# Patient Record
Sex: FEMALE | Race: BLACK OR AFRICAN AMERICAN | NOT HISPANIC OR LATINO | Employment: FULL TIME | URBAN - METROPOLITAN AREA
[De-identification: names, ages, dates, MRNs, and addresses within clinical notes are randomized per-mention and may not be internally consistent; named-entity substitution may affect disease eponyms.]

---

## 2017-10-19 ENCOUNTER — OFFICE VISIT (OUTPATIENT)
Dept: URGENT CARE | Facility: CLINIC | Age: 47
End: 2017-10-19
Payer: MEDICAID

## 2017-10-19 VITALS
OXYGEN SATURATION: 100 % | WEIGHT: 154 LBS | HEIGHT: 66 IN | HEART RATE: 86 BPM | TEMPERATURE: 99 F | BODY MASS INDEX: 24.75 KG/M2 | DIASTOLIC BLOOD PRESSURE: 86 MMHG | RESPIRATION RATE: 18 BRPM | SYSTOLIC BLOOD PRESSURE: 122 MMHG

## 2017-10-19 DIAGNOSIS — J01.90 ACUTE SINUSITIS, RECURRENCE NOT SPECIFIED, UNSPECIFIED LOCATION: ICD-10-CM

## 2017-10-19 DIAGNOSIS — J02.9 ACUTE PHARYNGITIS, UNSPECIFIED ETIOLOGY: Primary | ICD-10-CM

## 2017-10-19 PROCEDURE — 99203 OFFICE O/P NEW LOW 30 MIN: CPT | Mod: 25,S$GLB,, | Performed by: FAMILY MEDICINE

## 2017-10-19 RX ORDER — DEXAMETHASONE SODIUM PHOSPHATE 100 MG/10ML
5 INJECTION INTRAMUSCULAR; INTRAVENOUS
Status: COMPLETED | OUTPATIENT
Start: 2017-10-19 | End: 2017-10-19

## 2017-10-19 RX ORDER — CEFDINIR 300 MG/1
300 CAPSULE ORAL 2 TIMES DAILY
Qty: 20 CAPSULE | Refills: 0 | Status: SHIPPED | OUTPATIENT
Start: 2017-10-19 | End: 2017-10-29

## 2017-10-19 RX ADMIN — DEXAMETHASONE SODIUM PHOSPHATE 5 MG: 100 INJECTION INTRAMUSCULAR; INTRAVENOUS at 11:10

## 2017-10-19 NOTE — PATIENT INSTRUCTIONS
Please drink plenty of fluids.  Please get plenty of rest.  Please return here or go to the Emergency Department for any concerns or worsening of condition.  If you were given wait & see antibiotics, please wait 3-5 days before taking them, and only take them if your symptoms have worsened or not improved.  If you do begin taking the antibiotics, please take them to completion.  If you were prescribed antibiotics, please take them to completion.    If you were prescribed a narcotic medication, do not drive or operate heavy equipment or machinery while taking these medications.    You were given a decongestant (RESCON or POLY VENT Dm).  If your insurance does not cover it or you cannot afford it, it is ok to use the over the counter products listed below.  If you do not have Hypertension or any history of palpitations, it is ok to take over the counter Sudafed or Mucinex D or Allegra-D or Claritin-D or Zyrtec-D.  If you do take one of the above, it is ok to combine that with plain over the counter Mucinex or Allegra or Claritin or Zyrtec.  If for example you are taking Zyrtec -D, you can combine that with Mucinex, but not Mucinex-D.  If you are taking Mucinex-D, you can combine that with plain Allegra or Claritin or Zyrtec.   If you do have Hypertension or palpitations, it is safe to take Coricidin HBP for relief of sinus symptoms.    We recommend you take over the counter Flonase (Fluticasone) or another nasally inhaled steroid unless you are already taking one.      Nasal irrigation with a saline spray or Netti Pot like device per their directions is also recommended.  If not allergic, please take over the counter Tylenol (Acetaminophen)  as directed for control of pain and/or fever.  You may take Sudafed every 6 hours as directed for congestion.    You were given an injection of steroid.  This will relieve swelling and inflammation and improve respiratory symptoms.  It can raise your blood sugar if you are  diabetic.    Please follow up with your primary care doctor or specialist as needed.    Aram Meneses MD  [phone]    If you  smoke, please stop smoking.         Pharyngitis: Strep (Presumed)    You have pharyngitis (sore throat). The cause is thought to be the streptococcus, or strep, bacterium. Strep throat infection can cause throat pain that is worse when swallowing, aching all over, headache, and fever. The infection may be spread by coughing, kissing, or touching others after touching your mouth or nose. Antibiotic medications are given to treat the infection.  Home care  · Rest at home. Drink plenty of fluids to avoid dehydration.  · No work or school for the first 2 days of taking the antibiotics. After this time, you will not be contagious. You can then return to work or school if you are feeling better.   · The antibiotic medication must be taken for the full 10 days, even if you feel better. This is very important to ensure the infection is treated. It is also important to prevent drug-resistant organisms from developing. If you were given an antibiotic shot, no more antibiotics are needed.  · You may use acetaminophen or ibuprofen to control pain or fever, unless another medicine was prescribed for this. If you have chronic liver or kidney disease or ever had a stomach ulcer or GI bleeding, talk with your doctor before using these medicines.  · Throat lozenges or a throat-numbing sprays can help reduce throat pain. Gargling with warm salt water can also help. Dissolve 1/2 teaspoon of salt in 1 8 ounce glass of warm water.   · Avoid salty or spicy foods, which can irritate the throat.  Follow-up care  Follow up with your healthcare provider or our staff if you are not improving over the next week.  When to seek medical advice  Call your healthcare provider right away if any of these occur:  · Fever as directed by your doctor.   · New or worsening ear pain, sinus pain, or headache  · Painful lumps  in the back of neck  · Stiff neck  · Lymph nodes are getting larger  · Inability to swallow liquids, excessive drooling, or inability to open mouth wide due to throat pain  · Signs of dehydration (very dark urine or no urine, sunken eyes, dizziness)  · Trouble breathing or noisy breathing  · Muffled voice  · New rash  Date Last Reviewed: 4/13/2015 © 2000-2016 [employer]. [address] [place] [address], [place] [address]. All rights reserved. This information is not intended as a substitute for professional medical care. Always follow your healthcare professional's instructions.      Acute Bacterial Rhinosinusitis (ABRS)    Acute bacterial rhinosinusitis (ABRS) is an infection of your nasal cavity and sinuses. Its caused by bacteria. Acute means that youve had symptoms for less than 12 weeks.  Understanding your sinuses  The nasal cavity is the large air-filled space behind your nose. The sinuses are a group of spaces formed by the bones of your face. They connect with your nasal cavity. ABRS causes the tissue lining these spaces to become inflamed. Mucus may not drain normally. This leads to facial pain and other symptoms.  What causes ABRS?  ABRS most often follows an upper respiratory infection caused by a virus. Bacteria then infect the lining of your nasal cavity and sinuses. But you can also get ABRS if you have:  · Nasal allergies  · Long-term nasal swelling and congestion not caused by allergies  · Blockage in the nose  Symptoms of ABRS  The symptoms of ABRS may be different for each person, and can include:  · Nasal congestion  · Runny nose  · Fluid draining from the nose down the throat (postnasal drip)  · Headache  · Cough  · Pain in the sinuses  · Thick, colored fluid from the nose (mucus)  · Fever  Diagnosing ABRS  ABRS may be diagnosed if youve had an upper respiratory infection like a cold and cough for longer than 10 to 14 days. Your health care provider will ask about your symptoms and  your medical history. The provider will check your vital signs, including your temperature. Youll have a physical exam. The health care provider will check your ears, nose, and throat. You likely wont need any tests. If ABRS comes back, you may have a culture or other tests.  Treatment for ABRS  Treatment may include:  · Antibiotic medicine. This is for symptoms that last for at least 10 to 14 days.  · Nasal corticosteroid medicine. Drops or spray used in the nose can lessen swelling and congestion.  · Over-the-counter pain medicine. This is to lessen sinus pain and pressure.  · Nasal decongestant medicine. Spray or drops may help to lessen congestion. Do not use them for more than a few days.  · Salt wash (saline irrigation). This can help to loosen mucus.  Possible complications of ABRS  ABRS may come back or become long-term (chronic).  In rare cases, ABRS may cause complications such as:   · Inflamed tissue around the brain and spinal cord (meningitis)  · Inflamed tissue around the eyes (orbital cellulitis)  · Inflamed bones around the sinuses (osteitis)  These problems may need to be treated in a hospital with intravenous (IV) antibiotic medicine or surgery.  When to call the health care provider  Call your health care provider if you have any of the following:  · Symptoms that dont get better, or get worse  · Symptoms that dont get better after 3 to 5 days on antibiotics  · Trouble seeing  · Swelling around your eyes  · Confusion or trouble staying awake   Date Last Reviewed: 3/3/2015  © 1112-4906 [employer]. [address] [place] [address], [place] [address]. All rights reserved. This information is not intended as a substitute for professional medical care. Always follow your healthcare professional's instructions.

## 2017-10-19 NOTE — LETTER
October 19, 2017  Dalia Valentine   [address]  [address] [facility] [address]                [facility]  [address], [address]  [facility] [address]  Phone: [phone]  Fax: [phone] Dalia Valentine was seen and treated in our Urgent Care department on 10/19/2017. She may return to work in 2 - 3 days.      If you have any questions or concerns, please don't hesitate to call.    Sincerely,        Eliu Kolb MD

## 2017-10-19 NOTE — PROGRESS NOTES
"Subjective:       Patient ID: Dalia Valentine is a 47 y.o. female.    Vitals:  height is 5' 6" (1.676 m) and weight is 69.9 kg (154 lb). Her temperature is 98.7 °F (37.1 °C). Her blood pressure is 122/86 and her pulse is 86. Her respiration is 18 and oxygen saturation is 100%.     Chief Complaint: Cough and Hoarse    Cough   This is a new problem. The current episode started 1 to 4 weeks ago. The problem has been gradually worsening. The problem occurs every few minutes. The cough is non-productive. Associated symptoms include chest pain, ear pain, heartburn, nasal congestion and postnasal drip. Pertinent negatives include no chills, eye redness, fever, headaches, myalgias, rash, sore throat, shortness of breath or wheezing. Nothing aggravates the symptoms. She has tried ipratropium inhaler, rest and steroid inhaler for the symptoms. The treatment provided mild relief. Her past medical history is significant for bronchitis and environmental allergies.     Review of Systems   Constitution: Positive for malaise/fatigue. Negative for chills and fever.   HENT: Positive for congestion, ear pain, hoarse voice and postnasal drip. Negative for sore throat.    Eyes: Negative for blurred vision, discharge and redness.   Cardiovascular: Positive for chest pain. Negative for dyspnea on exertion and leg swelling.        Substernal and right chest soreness   Respiratory: Positive for cough. Negative for shortness of breath, sputum production and wheezing.    Skin: Negative for rash.   Musculoskeletal: Positive for back pain. Negative for joint pain and myalgias.   Gastrointestinal: Positive for heartburn. Negative for abdominal pain, diarrhea, nausea and vomiting.   Neurological: Negative for headaches.   Psychiatric/Behavioral: The patient is not nervous/anxious.    Allergic/Immunologic: Positive for environmental allergies.       Objective:      Physical Exam   Constitutional: She is oriented to person, place, and " time. She appears well-developed and well-nourished. She is cooperative.  Non-toxic appearance. She does not appear ill. No distress.   HENT:   Head: Normocephalic and atraumatic.   Right Ear: Hearing, tympanic membrane, external ear and ear canal normal.   Left Ear: Hearing, tympanic membrane, external ear and ear canal normal.   Nose: No mucosal edema, rhinorrhea or nasal deformity. No epistaxis. Right sinus exhibits maxillary sinus tenderness and frontal sinus tenderness. Left sinus exhibits maxillary sinus tenderness and frontal sinus tenderness.   Mouth/Throat: Uvula is midline and mucous membranes are normal. No trismus in the jaw. Normal dentition. No uvula swelling. Posterior oropharyngeal edema and posterior oropharyngeal erythema present.   Eyes: Conjunctivae and lids are normal. No scleral icterus.   Sclera clear bilat   Neck: Trachea normal, full passive range of motion without pain and phonation normal. Neck supple.   Cardiovascular: Normal rate, regular rhythm, normal heart sounds, intact distal pulses and normal pulses.    Pulmonary/Chest: Effort normal and breath sounds normal. No respiratory distress.   Abdominal: Soft. Normal appearance and bowel sounds are normal. She exhibits no distension. There is no tenderness.   Musculoskeletal: Normal range of motion. She exhibits no edema or deformity.   Neurological: She is alert and oriented to person, place, and time. She exhibits normal muscle tone. Coordination normal.   Skin: Skin is warm, dry and intact. She is not diaphoretic. No pallor.   Psychiatric: She has a normal mood and affect. Her speech is normal and behavior is normal. Judgment and thought content normal. Cognition and memory are normal.   Nursing note and vitals reviewed.      Assessment:       1. Acute pharyngitis, unspecified etiology    2. Acute sinusitis, recurrence not specified, unspecified location        Plan:         Acute pharyngitis, unspecified etiology    Acute sinusitis,  recurrence not specified, unspecified location    Other orders  -     cefdinir (OMNICEF) 300 MG capsule; Take 1 capsule (300 mg total) by mouth 2 (two) times daily.  Dispense: 20 capsule; Refill: 0  -     pseudoephedrine-DM-guaifenesin (POLY-VENT DM) 60- mg Tab; Take 1 tablet by mouth every 6 (six) hours as needed.  Dispense: 20 tablet; Refill: 0  -     dexamethasone injection 5 mg; Inject 0.5 mLs (5 mg total) into the muscle one time.      Please drink plenty of fluids.  Please get plenty of rest.  Please return here or go to the Emergency Department for any concerns or worsening of condition.  If you were given wait & see antibiotics, please wait 3-5 days before taking them, and only take them if your symptoms have worsened or not improved.  If you do begin taking the antibiotics, please take them to completion.  If you were prescribed antibiotics, please take them to completion.  If you were prescribed a narcotic medication, do not drive or operate heavy equipment or machinery while taking these medications.    You were given a decongestant (RESCON or POLY VENT Dm).  If your insurance does not cover it or you cannot afford it, it is ok to use the over the counter products listed below.  If you do not have Hypertension or any history of palpitations, it is ok to take over the counter Sudafed or Mucinex D or Allegra-D or Claritin-D or Zyrtec-D.  If you do take one of the above, it is ok to combine that with plain over the counter Mucinex or Allegra or Claritin or Zyrtec.  If for example you are taking Zyrtec -D, you can combine that with Mucinex, but not Mucinex-D.  If you are taking Mucinex-D, you can combine that with plain Allegra or Claritin or Zyrtec.   If you do have Hypertension or palpitations, it is safe to take Coricidin HBP for relief of sinus symptoms.    We recommend you take over the counter Flonase (Fluticasone) or another nasally inhaled steroid unless you are already taking one.  Nasal  irrigation with a saline spray or Netti Pot like device per their directions is also recommended.  If not allergic, please take over the counter Tylenol (Acetaminophen) and/or Motrin (Ibuprofen) as directed for control of pain and/or fever.    Robitussin DM 2 teas every 4 hours as needed for cough.  If you  smoke, please stop smoking.    You were given an injection of steroid.  This will relieve swelling and inflammation and improve respiratory symptoms.  It can raise your blood sugar if you are diabetic.    Please follow up with your primary care doctor or specialist as needed.  Aram Meneses MD  [phone]

## 2018-04-02 ENCOUNTER — OFFICE VISIT (OUTPATIENT)
Dept: URGENT CARE | Facility: CLINIC | Age: 48
End: 2018-04-02
Payer: MEDICAID

## 2018-04-02 VITALS
BODY MASS INDEX: 25.66 KG/M2 | RESPIRATION RATE: 20 BRPM | OXYGEN SATURATION: 100 % | HEART RATE: 68 BPM | SYSTOLIC BLOOD PRESSURE: 143 MMHG | WEIGHT: 154 LBS | DIASTOLIC BLOOD PRESSURE: 91 MMHG | TEMPERATURE: 99 F | HEIGHT: 65 IN

## 2018-04-02 DIAGNOSIS — J01.40 ACUTE NON-RECURRENT PANSINUSITIS: ICD-10-CM

## 2018-04-02 DIAGNOSIS — J02.9 ACUTE PHARYNGITIS, UNSPECIFIED ETIOLOGY: Primary | ICD-10-CM

## 2018-04-02 PROCEDURE — 99214 OFFICE O/P EST MOD 30 MIN: CPT | Mod: S$GLB,,, | Performed by: FAMILY MEDICINE

## 2018-04-02 RX ORDER — CEFDINIR 300 MG/1
300 CAPSULE ORAL 2 TIMES DAILY
Qty: 20 CAPSULE | Refills: 0 | Status: SHIPPED | OUTPATIENT
Start: 2018-04-02 | End: 2018-04-12

## 2018-04-02 RX ORDER — DEXAMETHASONE SODIUM PHOSPHATE 100 MG/10ML
5 INJECTION INTRAMUSCULAR; INTRAVENOUS
Status: COMPLETED | OUTPATIENT
Start: 2018-04-02 | End: 2018-04-02

## 2018-04-02 RX ADMIN — DEXAMETHASONE SODIUM PHOSPHATE 5 MG: 100 INJECTION INTRAMUSCULAR; INTRAVENOUS at 05:04

## 2018-04-02 NOTE — PATIENT INSTRUCTIONS
Please drink plenty of fluids.  Please get plenty of rest.  Please return here or go to the Emergency Department for any concerns or worsening of condition.  If you were given wait & see antibiotics, please wait 3-5 days before taking them, and only take them if your symptoms have worsened or not improved.  If you do begin taking the antibiotics, please take them to completion.  If you were prescribed antibiotics, please take them to completion.  If you were prescribed a narcotic medication, do not drive or operate heavy equipment or machinery while taking these medications.    You were given a decongestant (RESCON or POLY VENT Dm).  If your insurance does not cover it or you cannot afford it, it is ok to use the over the counter products listed below.  If you do not have Hypertension or any history of palpitations, it is ok to take over the counter Sudafed or Mucinex D or Allegra-D or Claritin-D or Zyrtec-D.  If you do take one of the above, it is ok to combine that with plain over the counter Mucinex or Allegra or Claritin or Zyrtec.  If for example you are taking Zyrtec -D, you can combine that with Mucinex, but not Mucinex-D.  If you are taking Mucinex-D, you can combine that with plain Allegra or Claritin or Zyrtec.   If you do have Hypertension or palpitations, it is safe to take Coricidin HBP for relief of sinus symptoms.    We recommend you take over the counter Flonase (Fluticasone) or another nasally inhaled steroid unless you are already taking one.  Nasal irrigation with a saline spray or Netti Pot like device per their directions is also recommended.  If not allergic, please take over the counter Tylenol (Acetaminophen) and/or Motrin (Ibuprofen) as directed for control of pain and/or fever.    Robitussin DM 2 teas every 4 hours as needed for cough.  If you  smoke, please stop smoking.    You were given an injection of steroid.  This will relieve swelling and inflammation and improve respiratory  symptoms.  It can raise your blood sugar if you are diabetic.    Please follow up with your primary care doctor or specialist as needed.  Aarm Meneses MD  [phone]        Acute Bacterial Rhinosinusitis (ABRS)  Acute bacterial rhinosinusitis (ABRS) is an infection of your nasal cavity and sinuses. Its caused by bacteria. Acute means that youve had symptoms for less than 12 weeks.  Understanding your sinuses  The nasal cavity is the large air-filled space behind your nose. The sinuses are a group of spaces formed by the bones of your face. They connect with your nasal cavity. ABRS causes the tissue lining these spaces to become inflamed. Mucus may not drain normally. This leads to facial pain and other symptoms.  What causes ABRS?  ABRS most often follows an upper respiratory infection caused by a virus. Bacteria then infect the lining of your nasal cavity and sinuses. But you can also get ABRS if you have:  · Nasal allergies  · Long-term nasal swelling and congestion not caused by allergies  · Blockage in the nose  Symptoms of ABRS  The symptoms of ABRS may be different for each person, and can include:  · Nasal congestion  · Runny nose  · Fluid draining from the nose down the throat (postnasal drip)  · Headache  · Cough  · Pain in the sinuses  · Thick, colored fluid from the nose (mucus)  · Fever  Diagnosing ABRS  ABRS may be diagnosed if youve had an upper respiratory infection like a cold and cough for longer than 10 to 14 days. Your health care provider will ask about your symptoms and your medical history. The provider will check your vital signs, including your temperature. Youll have a physical exam. The health care provider will check your ears, nose, and throat. You likely wont need any tests. If ABRS comes back, you may have a culture or other tests.  Treatment for ABRS  Treatment may include:  · Antibiotic medicine. This is for symptoms that last for at least 10 to 14 days.  · Nasal  corticosteroid medicine. Drops or spray used in the nose can lessen swelling and congestion.  · Over-the-counter pain medicine. This is to lessen sinus pain and pressure.  · Nasal decongestant medicine. Spray or drops may help to lessen congestion. Do not use them for more than a few days.  · Salt wash (saline irrigation). This can help to loosen mucus.  Possible complications of ABRS  ABRS may come back or become long-term (chronic).  In rare cases, ABRS may cause complications such as:   · Inflamed tissue around the brain and spinal cord (meningitis)  · Inflamed tissue around the eyes (orbital cellulitis)  · Inflamed bones around the sinuses (osteitis)  These problems may need to be treated in a hospital with intravenous (IV) antibiotic medicine or surgery.  When to call the health care provider  Call your health care provider if you have any of the following:  · Symptoms that dont get better, or get worse  · Symptoms that dont get better after 3 to 5 days on antibiotics  · Trouble seeing  · Swelling around your eyes  · Confusion or trouble staying awake   Date Last Reviewed: 3/3/2015  © 5854-9719 [employer]. [address] [place] [address]. All rights reserved. This information is not intended as a substitute for professional medical care. Always follow your healthcare professional's instructions.      Pharyngitis: Strep (Presumed)    You have pharyngitis (sore throat). The cause is thought to be the streptococcus, or strep, bacterium. Strep throat infection can cause throat pain that is worse when swallowing, aching all over, headache, and fever. The infection may be spread by coughing, kissing, or touching others after touching your mouth or nose. Antibiotic medications are given to treat the infection.  Home care  · Rest at home. Drink plenty of fluids to avoid dehydration.  · No work or school for the first 2 days of taking the antibiotics. After this time, you will not be  contagious. You can then return to work or school if you are feeling better.   · The antibiotic medication must be taken for the full 10 days, even if you feel better. This is very important to ensure the infection is treated. It is also important to prevent drug-resistant organisms from developing. If you were given an antibiotic shot, no more antibiotics are needed.  · You may use acetaminophen or ibuprofen to control pain or fever, unless another medicine was prescribed for this. If you have chronic liver or kidney disease or ever had a stomach ulcer or GI bleeding, talk with your doctor before using these medicines.  · Throat lozenges or a throat-numbing sprays can help reduce throat pain. Gargling with warm salt water can also help. Dissolve 1/2 teaspoon of salt in 1 8 ounce glass of warm water.   · Avoid salty or spicy foods, which can irritate the throat.  Follow-up care  Follow up with your healthcare provider or our staff if you are not improving over the next week.  When to seek medical advice  Call your healthcare provider right away if any of these occur:  · Fever as directed by your doctor.   · New or worsening ear pain, sinus pain, or headache  · Painful lumps in the back of neck  · Stiff neck  · Lymph nodes are getting larger  · Inability to swallow liquids, excessive drooling, or inability to open mouth wide due to throat pain  · Signs of dehydration (very dark urine or no urine, sunken eyes, dizziness)  · Trouble breathing or noisy breathing  · Muffled voice  · New rash  Date Last Reviewed: 4/13/2015  © 8703-0569 [email or web address]. [address] [place] [address], [place] [address]. All rights reserved. This information is not intended as a substitute for professional medical care. Always follow your healthcare professional's instructions.

## 2018-04-02 NOTE — PROGRESS NOTES
"Subjective:       Patient ID: Dalia Valentine is a 47 y.o. female.    Vitals:  height is 5' 5" (1.651 m) and weight is 69.9 kg (154 lb). Her oral temperature is 98.9 °F (37.2 °C). Her blood pressure is 143/91 (abnormal) and her pulse is 68. Her respiration is 20 and oxygen saturation is 100%.     Chief Complaint: Sinus Problem    Sinus Problem   This is a new problem. The current episode started more than 1 month ago (1 month). The problem has been gradually worsening since onset. There has been no fever. Associated symptoms include congestion and coughing. Pertinent negatives include no chills, ear pain, headaches, hoarse voice, shortness of breath or sore throat. Past treatments include oral decongestants. The treatment provided mild relief.     Review of Systems   Constitution: Negative for chills, fever and malaise/fatigue.   HENT: Positive for congestion and nosebleeds. Negative for ear pain, hoarse voice and sore throat.    Eyes: Negative for discharge and redness.   Cardiovascular: Negative for chest pain, dyspnea on exertion and leg swelling.   Respiratory: Positive for cough. Negative for shortness of breath, sputum production and wheezing.    Musculoskeletal: Negative for myalgias.   Gastrointestinal: Negative for abdominal pain and nausea.   Neurological: Negative for headaches.       Objective:      Physical Exam   Constitutional: She is oriented to person, place, and time. She appears well-developed and well-nourished. She is cooperative.  Non-toxic appearance. She does not appear ill. No distress.   HENT:   Head: Normocephalic and atraumatic.   Right Ear: Hearing, tympanic membrane, external ear and ear canal normal.   Left Ear: Hearing, tympanic membrane, external ear and ear canal normal.   Nose: No mucosal edema, rhinorrhea or nasal deformity. No epistaxis. Right sinus exhibits maxillary sinus tenderness and frontal sinus tenderness. Left sinus exhibits maxillary sinus tenderness and " frontal sinus tenderness.   Mouth/Throat: Uvula is midline and mucous membranes are normal. No trismus in the jaw. Normal dentition. No uvula swelling. Posterior oropharyngeal edema and posterior oropharyngeal erythema present.   Eyes: Conjunctivae and lids are normal. No scleral icterus.   Sclera clear bilat   Neck: Trachea normal, full passive range of motion without pain and phonation normal. Neck supple.   Cardiovascular: Normal rate, regular rhythm, normal heart sounds, intact distal pulses and normal pulses.    Pulmonary/Chest: Effort normal and breath sounds normal. No respiratory distress.   Abdominal: Soft. Normal appearance and bowel sounds are normal. She exhibits no distension. There is no tenderness.   Musculoskeletal: Normal range of motion. She exhibits no edema or deformity.   Neurological: She is alert and oriented to person, place, and time. She exhibits normal muscle tone. Coordination normal.   Skin: Skin is warm, dry and intact. She is not diaphoretic. No pallor.   Psychiatric: She has a normal mood and affect. Her speech is normal and behavior is normal. Judgment and thought content normal. Cognition and memory are normal.   Nursing note and vitals reviewed.      Assessment:       1. Acute pharyngitis, unspecified etiology    2. Acute non-recurrent pansinusitis        Plan:         Acute pharyngitis, unspecified etiology    Acute non-recurrent pansinusitis    Other orders  -     cefdinir (OMNICEF) 300 MG capsule; Take 1 capsule (300 mg total) by mouth 2 (two) times daily.  Dispense: 20 capsule; Refill: 0  -     pseudoephedrine-DM-guaifenesin (POLY-VENT DM) 60- mg Tab; Take 1 tablet by mouth every 6 (six) hours as needed.  Dispense: 20 tablet; Refill: 0  -     dexamethasone injection 5 mg; Inject 0.5 mLs (5 mg total) into the muscle one time.      Please drink plenty of fluids.  Please get plenty of rest.  Please return here or go to the Emergency Department for any concerns or worsening  of condition.  If you were given wait & see antibiotics, please wait 3-5 days before taking them, and only take them if your symptoms have worsened or not improved.  If you do begin taking the antibiotics, please take them to completion.  If you were prescribed antibiotics, please take them to completion.  If you were prescribed a narcotic medication, do not drive or operate heavy equipment or machinery while taking these medications.    You were given a decongestant (RESCON or POLY VENT Dm).  If your insurance does not cover it or you cannot afford it, it is ok to use the over the counter products listed below.  If you do not have Hypertension or any history of palpitations, it is ok to take over the counter Sudafed or Mucinex D or Allegra-D or Claritin-D or Zyrtec-D.  If you do take one of the above, it is ok to combine that with plain over the counter Mucinex or Allegra or Claritin or Zyrtec.  If for example you are taking Zyrtec -D, you can combine that with Mucinex, but not Mucinex-D.  If you are taking Mucinex-D, you can combine that with plain Allegra or Claritin or Zyrtec.   If you do have Hypertension or palpitations, it is safe to take Coricidin HBP for relief of sinus symptoms.    We recommend you take over the counter Flonase (Fluticasone) or another nasally inhaled steroid unless you are already taking one.  Nasal irrigation with a saline spray or Netti Pot like device per their directions is also recommended.  If not allergic, please take over the counter Tylenol (Acetaminophen) and/or Motrin (Ibuprofen) as directed for control of pain and/or fever.    Robitussin DM 2 teas every 4 hours as needed for cough.  If you  smoke, please stop smoking.    You were given an injection of steroid.  This will relieve swelling and inflammation and improve respiratory symptoms.  It can raise your blood sugar if you are diabetic.    Please follow up with your primary care doctor or specialist as needed.  Aram DIGGS  MD [facility]  [phone]

## 2018-04-02 NOTE — LETTER
April 2, 2018  Dalia Valentine   [address]  [address] [address]                [facility]  [address], [address]  [place] [address]  Phone: [phone]  Fax: [phone] Dalia Valentine was seen and treated in our Urgent Care department   on 4/2/2018. She may return to work in 2 - 3 days.      If you have any questions or concerns, please don't hesitate to call.    Sincerely,        Eliu Kolb MD

## 2018-05-15 PROBLEM — Z86.2 HISTORY OF IRON DEFICIENCY ANEMIA: Status: ACTIVE | Noted: 2018-05-15

## 2018-08-12 ENCOUNTER — OFFICE VISIT (OUTPATIENT)
Dept: URGENT CARE | Facility: CLINIC | Age: 48
End: 2018-08-12
Payer: MEDICAID

## 2018-08-12 VITALS
TEMPERATURE: 99 F | WEIGHT: 163 LBS | HEART RATE: 92 BPM | OXYGEN SATURATION: 98 % | HEIGHT: 66 IN | DIASTOLIC BLOOD PRESSURE: 95 MMHG | SYSTOLIC BLOOD PRESSURE: 138 MMHG | BODY MASS INDEX: 26.2 KG/M2

## 2018-08-12 DIAGNOSIS — J02.9 ACUTE PHARYNGITIS, UNSPECIFIED ETIOLOGY: Primary | ICD-10-CM

## 2018-08-12 DIAGNOSIS — J01.90 ACUTE SINUSITIS, RECURRENCE NOT SPECIFIED, UNSPECIFIED LOCATION: ICD-10-CM

## 2018-08-12 PROCEDURE — 99214 OFFICE O/P EST MOD 30 MIN: CPT | Mod: S$GLB,,, | Performed by: FAMILY MEDICINE

## 2018-08-12 RX ORDER — DEXAMETHASONE SODIUM PHOSPHATE 100 MG/10ML
5 INJECTION INTRAMUSCULAR; INTRAVENOUS
Status: COMPLETED | OUTPATIENT
Start: 2018-08-12 | End: 2018-08-12

## 2018-08-12 RX ORDER — AMITRIPTYLINE HYDROCHLORIDE 10 MG/1
10 TABLET, FILM COATED ORAL NIGHTLY PRN
COMMUNITY
End: 2021-04-01 | Stop reason: SDUPTHER

## 2018-08-12 RX ORDER — CEFDINIR 300 MG/1
300 CAPSULE ORAL 2 TIMES DAILY
Qty: 20 CAPSULE | Refills: 0 | Status: SHIPPED | OUTPATIENT
Start: 2018-08-12 | End: 2018-08-22

## 2018-08-12 RX ORDER — PREDNISONE 20 MG/1
40 TABLET ORAL DAILY
Qty: 10 TABLET | Refills: 0 | Status: SHIPPED | OUTPATIENT
Start: 2018-08-12 | End: 2018-08-17

## 2018-08-12 RX ADMIN — DEXAMETHASONE SODIUM PHOSPHATE 5 MG: 100 INJECTION INTRAMUSCULAR; INTRAVENOUS at 05:08

## 2018-08-12 NOTE — PROGRESS NOTES
"Subjective:       Patient ID: Dalia Valentine is a 47 y.o. female.    Vitals:  height is 5' 6" (1.676 m) and weight is 73.9 kg (163 lb). Her oral temperature is 98.9 °F (37.2 °C). Her blood pressure is 138/95 (abnormal) and her pulse is 92. Her oxygen saturation is 98%.     Chief Complaint: Sinus Problem    Sinus Problem   This is a new problem. Episode onset: 3 days ago. The problem has been gradually worsening since onset. There has been no fever. Associated symptoms include chills, congestion, coughing, ear pain, headaches, neck pain, sinus pressure, sneezing and a sore throat (right side of throat hurts). Pertinent negatives include no hoarse voice or shortness of breath. Past treatments include acetaminophen. The treatment provided no relief.     Review of Systems   Constitution: Positive for chills. Negative for fever and malaise/fatigue.   HENT: Positive for congestion, ear pain, sinus pressure, sneezing and sore throat (right side of throat hurts). Negative for hoarse voice.    Eyes: Negative for discharge and redness.   Cardiovascular: Negative for chest pain, dyspnea on exertion and leg swelling.   Respiratory: Positive for cough. Negative for shortness of breath, sputum production and wheezing.    Musculoskeletal: Positive for neck pain. Negative for myalgias.   Gastrointestinal: Positive for flatus. Negative for abdominal pain and nausea.   Neurological: Positive for headaches.       Objective:      Physical Exam   Constitutional: She is oriented to person, place, and time. She appears well-developed and well-nourished. She is cooperative.  Non-toxic appearance. She does not appear ill. No distress.   HENT:   Head: Normocephalic and atraumatic.   Right Ear: Hearing, tympanic membrane, external ear and ear canal normal.   Left Ear: Hearing, tympanic membrane, external ear and ear canal normal.   Nose: No mucosal edema, rhinorrhea or nasal deformity. No epistaxis. Right sinus exhibits maxillary " sinus tenderness and frontal sinus tenderness. Left sinus exhibits maxillary sinus tenderness and frontal sinus tenderness.   Mouth/Throat: Uvula is midline and mucous membranes are normal. No trismus in the jaw. Normal dentition. No uvula swelling. Posterior oropharyngeal edema and posterior oropharyngeal erythema present.   Eyes: Conjunctivae and lids are normal. No scleral icterus.   Neck: Trachea normal, full passive range of motion without pain and phonation normal. Neck supple.   Cardiovascular: Normal rate, regular rhythm, normal heart sounds, intact distal pulses and normal pulses.   Pulmonary/Chest: Effort normal and breath sounds normal. No respiratory distress.   Abdominal: Soft. Normal appearance and bowel sounds are normal. She exhibits no distension. There is no tenderness.   Musculoskeletal: Normal range of motion. She exhibits no edema or deformity.   Neurological: She is alert and oriented to person, place, and time. She exhibits normal muscle tone. Coordination normal.   Skin: Skin is warm, dry and intact. She is not diaphoretic. No pallor.   Psychiatric: She has a normal mood and affect. Her speech is normal and behavior is normal. Judgment and thought content normal. Cognition and memory are normal.   Nursing note and vitals reviewed.      Assessment:       1. Acute pharyngitis, unspecified etiology    2. Acute sinusitis, recurrence not specified, unspecified location        Plan:         Acute pharyngitis, unspecified etiology    Acute sinusitis, recurrence not specified, unspecified location  -     predniSONE (DELTASONE) 20 MG tablet; Take 2 tablets (40 mg total) by mouth once daily. for 5 days  Dispense: 10 tablet; Refill: 0  -     cefdinir (OMNICEF) 300 MG capsule; Take 1 capsule (300 mg total) by mouth 2 (two) times daily. for 10 days  Dispense: 20 capsule; Refill: 0  -     pseudoephedrine-DM-guaifenesin (POLY-VENT DM) 60- mg Tab; Take 1 tablet by mouth every 6 (six) hours as needed.   Dispense: 20 tablet; Refill: 0  -     dexamethasone injection 5 mg; Inject 0.5 mLs (5 mg total) into the muscle one time.    Please drink plenty of fluids.  Please get plenty of rest.  Please return here or go to the Emergency Department for any concerns or worsening of condition.  If you were given wait & see antibiotics, please wait 3-5 days before taking them, and only take them if your symptoms have worsened or not improved.  If you do begin taking the antibiotics, please take them to completion.  If you were prescribed antibiotics, please take them to completion.  If you were prescribed a narcotic medication, do not drive or operate heavy equipment or machinery while taking these medications.    You were given a decongestant (RESCON or POLY VENT Dm).  If your insurance does not cover it or you cannot afford it, it is ok to use the over the counter products listed below.  If you do not have Hypertension or any history of palpitations, it is ok to take over the counter Sudafed or Mucinex D or Allegra-D or Claritin-D or Zyrtec-D.  If you do take one of the above, it is ok to combine that with plain over the counter Mucinex or Allegra or Claritin or Zyrtec.  If for example you are taking Zyrtec -D, you can combine that with Mucinex, but not Mucinex-D.  If you are taking Mucinex-D, you can combine that with plain Allegra or Claritin or Zyrtec.   If you do have Hypertension or palpitations, it is safe to take Coricidin HBP for relief of sinus symptoms.    We recommend you take over the counter Flonase (Fluticasone) or another nasally inhaled steroid unless you are already taking one.  Nasal irrigation with a saline spray or Netti Pot like device per their directions is also recommended.  If not allergic, please take over the counter Tylenol (Acetaminophen) and/or Motrin (Ibuprofen) as directed for control of pain and/or fever.    Robitussin DM 2 teas every 4 hours as needed for cough.  If you  smoke, please stop  smoking.    You were given an injection of steroid.  This will relieve swelling and inflammation and improve respiratory symptoms.  It can raise your blood sugar if you are diabetic.    Please follow up with your primary care doctor or specialist as needed.  Aram Meneses MD  [phone]

## 2018-08-12 NOTE — PATIENT INSTRUCTIONS
Please drink plenty of fluids.  Please get plenty of rest.  Please return here or go to the Emergency Department for any concerns or worsening of condition.  If you were given wait & see antibiotics, please wait 3-5 days before taking them, and only take them if your symptoms have worsened or not improved.  If you do begin taking the antibiotics, please take them to completion.  If you were prescribed antibiotics, please take them to completion.  If you were prescribed a narcotic medication, do not drive or operate heavy equipment or machinery while taking these medications.    You were given a decongestant (RESCON or POLY VENT Dm).  If your insurance does not cover it or you cannot afford it, it is ok to use the over the counter products listed below.  If you do not have Hypertension or any history of palpitations, it is ok to take over the counter Sudafed or Mucinex D or Allegra-D or Claritin-D or Zyrtec-D.  If you do take one of the above, it is ok to combine that with plain over the counter Mucinex or Allegra or Claritin or Zyrtec.  If for example you are taking Zyrtec -D, you can combine that with Mucinex, but not Mucinex-D.  If you are taking Mucinex-D, you can combine that with plain Allegra or Claritin or Zyrtec.   If you do have Hypertension or palpitations, it is safe to take Coricidin HBP for relief of sinus symptoms.    We recommend you take over the counter Flonase (Fluticasone) or another nasally inhaled steroid unless you are already taking one.  Nasal irrigation with a saline spray or Netti Pot like device per their directions is also recommended.  If not allergic, please take over the counter Tylenol (Acetaminophen) and/or Motrin (Ibuprofen) as directed for control of pain and/or fever.    Robitussin DM 2 teas every 4 hours as needed for cough.  If you  smoke, please stop smoking.    You were given an injection of steroid.  This will relieve swelling and inflammation and improve respiratory  symptoms.  It can raise your blood sugar if you are diabetic.    Please follow up with your primary care doctor or specialist as needed.  Aram Meneses MD  [phone]        Acute Bacterial Rhinosinusitis (ABRS)  Acute bacterial rhinosinusitis (ABRS) is an infection of your nasal cavity and sinuses. Its caused by bacteria. Acute means that youve had symptoms for less than 12 weeks.  Understanding your sinuses  The nasal cavity is the large air-filled space behind your nose. The sinuses are a group of spaces formed by the bones of your face. They connect with your nasal cavity. ABRS causes the tissue lining these spaces to become inflamed. Mucus may not drain normally. This leads to facial pain and other symptoms.  What causes ABRS?  ABRS most often follows an upper respiratory infection caused by a virus. Bacteria then infect the lining of your nasal cavity and sinuses. But you can also get ABRS if you have:  · Nasal allergies  · Long-term nasal swelling and congestion not caused by allergies  · Blockage in the nose  Symptoms of ABRS  The symptoms of ABRS may be different for each person, and can include:  · Nasal congestion  · Runny nose  · Fluid draining from the nose down the throat (postnasal drip)  · Headache  · Cough  · Pain in the sinuses  · Thick, colored fluid from the nose (mucus)  · Fever  Diagnosing ABRS  ABRS may be diagnosed if youve had an upper respiratory infection like a cold and cough for longer than 10 to 14 days. Your health care provider will ask about your symptoms and your medical history. The provider will check your vital signs, including your temperature. Youll have a physical exam. The health care provider will check your ears, nose, and throat. You likely wont need any tests. If ABRS comes back, you may have a culture or other tests.  Treatment for ABRS  Treatment may include:  · Antibiotic medicine. This is for symptoms that last for at least 10 to 14 days.  · Nasal  corticosteroid medicine. Drops or spray used in the nose can lessen swelling and congestion.  · Over-the-counter pain medicine. This is to lessen sinus pain and pressure.  · Nasal decongestant medicine. Spray or drops may help to lessen congestion. Do not use them for more than a few days.  · Salt wash (saline irrigation). This can help to loosen mucus.  Possible complications of ABRS  ABRS may come back or become long-term (chronic).  In rare cases, ABRS may cause complications such as:   · Inflamed tissue around the brain and spinal cord (meningitis)  · Inflamed tissue around the eyes (orbital cellulitis)  · Inflamed bones around the sinuses (osteitis)  These problems may need to be treated in a hospital with intravenous (IV) antibiotic medicine or surgery.  When to call the health care provider  Call your health care provider if you have any of the following:  · Symptoms that dont get better, or get worse  · Symptoms that dont get better after 3 to 5 days on antibiotics  · Trouble seeing  · Swelling around your eyes  · Confusion or trouble staying awake   Date Last Reviewed: 3/3/2015  © 4096-9078 [employer]. [address] [place] [address]. All rights reserved. This information is not intended as a substitute for professional medical care. Always follow your healthcare professional's instructions.      Pharyngitis: Strep (Presumed)    You have pharyngitis (sore throat). The cause is thought to be the streptococcus, or strep, bacterium. Strep throat infection can cause throat pain that is worse when swallowing, aching all over, headache, and fever. The infection may be spread by coughing, kissing, or touching others after touching your mouth or nose. Antibiotic medications are given to treat the infection.  Home care  · Rest at home. Drink plenty of fluids to avoid dehydration.  · No work or school for the first 2 days of taking the antibiotics. After this time, you will not be  contagious. You can then return to work or school if you are feeling better.   · The antibiotic medication must be taken for the full 10 days, even if you feel better. This is very important to ensure the infection is treated. It is also important to prevent drug-resistant organisms from developing. If you were given an antibiotic shot, no more antibiotics are needed.  · You may use acetaminophen or ibuprofen to control pain or fever, unless another medicine was prescribed for this. If you have chronic liver or kidney disease or ever had a stomach ulcer or GI bleeding, talk with your doctor before using these medicines.  · Throat lozenges or a throat-numbing sprays can help reduce throat pain. Gargling with warm salt water can also help. Dissolve 1/2 teaspoon of salt in 1 8 ounce glass of warm water.   · Avoid salty or spicy foods, which can irritate the throat.  Follow-up care  Follow up with your healthcare provider or our staff if you are not improving over the next week.  When to seek medical advice  Call your healthcare provider right away if any of these occur:  · Fever as directed by your doctor.   · New or worsening ear pain, sinus pain, or headache  · Painful lumps in the back of neck  · Stiff neck  · Lymph nodes are getting larger  · Inability to swallow liquids, excessive drooling, or inability to open mouth wide due to throat pain  · Signs of dehydration (very dark urine or no urine, sunken eyes, dizziness)  · Trouble breathing or noisy breathing  · Muffled voice  · New rash  Date Last Reviewed: 4/13/2015  © 3148-4911 [employer] [place]. [address] [place] [address], [place] [address]. All rights reserved. This information is not intended as a substitute for professional medical care. Always follow your healthcare professional's instructions.

## 2019-01-14 ENCOUNTER — OFFICE VISIT (OUTPATIENT)
Dept: URGENT CARE | Facility: CLINIC | Age: 49
End: 2019-01-14
Payer: MEDICAID

## 2019-01-14 VITALS
SYSTOLIC BLOOD PRESSURE: 140 MMHG | HEART RATE: 94 BPM | OXYGEN SATURATION: 100 % | DIASTOLIC BLOOD PRESSURE: 86 MMHG | HEIGHT: 66 IN | WEIGHT: 174 LBS | BODY MASS INDEX: 27.97 KG/M2 | TEMPERATURE: 99 F | RESPIRATION RATE: 20 BRPM

## 2019-01-14 DIAGNOSIS — H66.91 RIGHT OTITIS MEDIA, UNSPECIFIED OTITIS MEDIA TYPE: Primary | ICD-10-CM

## 2019-01-14 PROCEDURE — 99214 OFFICE O/P EST MOD 30 MIN: CPT | Mod: 25,S$GLB,, | Performed by: PHYSICIAN ASSISTANT

## 2019-01-14 PROCEDURE — 99214 PR OFFICE/OUTPT VISIT, EST, LEVL IV, 30-39 MIN: ICD-10-PCS | Mod: 25,S$GLB,, | Performed by: PHYSICIAN ASSISTANT

## 2019-01-14 RX ORDER — DEXAMETHASONE SODIUM PHOSPHATE 100 MG/10ML
5 INJECTION INTRAMUSCULAR; INTRAVENOUS
Status: COMPLETED | OUTPATIENT
Start: 2019-01-14 | End: 2019-01-14

## 2019-01-14 RX ORDER — CEFDINIR 300 MG/1
300 CAPSULE ORAL 2 TIMES DAILY
Qty: 20 CAPSULE | Refills: 0 | Status: SHIPPED | OUTPATIENT
Start: 2019-01-14 | End: 2019-01-24

## 2019-01-14 RX ORDER — CETIRIZINE HYDROCHLORIDE, PSEUDOEPHEDRINE HYDROCHLORIDE 5; 120 MG/1; MG/1
1 TABLET, FILM COATED, EXTENDED RELEASE ORAL DAILY
Qty: 14 TABLET | Refills: 0 | Status: SHIPPED | OUTPATIENT
Start: 2019-01-14 | End: 2019-01-28

## 2019-01-14 RX ADMIN — DEXAMETHASONE SODIUM PHOSPHATE 5 MG: 100 INJECTION INTRAMUSCULAR; INTRAVENOUS at 06:01

## 2019-01-14 NOTE — LETTER
January 14, 2019      [facility]  [address]  [facility] [address]  Phone: [phone]  Fax: [phone]       Patient: Dalia Valentine   YOB: 1970  Date of Visit: 01/14/2019    To Whom It May Concern:    Jacinto Valentine  was at [facility] on 01/14/2019. She may return to work/school on 1/16/2019 with no restrictions. If you have any questions or concerns, or if I can be of further assistance, please do not hesitate to contact me.    Sincerely,    Himanshu Garcia PA-C

## 2019-01-15 NOTE — PROGRESS NOTES
"Subjective:       Patient ID: Dalia Valentine is a 48 y.o. female.    Vitals:  height is 5' 6" (1.676 m) and weight is 78.9 kg (174 lb). Her oral temperature is 98.7 °F (37.1 °C). Her blood pressure is 140/86 (abnormal) and her pulse is 94. Her respiration is 20 and oxygen saturation is 100%.     Chief Complaint: Sore Throat    Sore Throat    The problem has been gradually worsening. The pain is at a severity of 8/10. The pain is moderate. Associated symptoms include congestion, ear pain (right), a hoarse voice and trouble swallowing. Pertinent negatives include no coughing, shortness of breath, stridor or vomiting. She has had no exposure to strep or mono. Treatments tried: decongestion. The treatment provided mild relief.       Constitution: Negative for chills, sweating, fatigue and fever.   HENT: Positive for ear pain (right), congestion, sore throat and trouble swallowing. Negative for sinus pain, sinus pressure and voice change.    Neck: Negative for painful lymph nodes.   Eyes: Negative for eye redness.   Respiratory: Negative for chest tightness, cough, sputum production, bloody sputum, COPD, shortness of breath, stridor, wheezing and asthma.    Gastrointestinal: Negative for nausea and vomiting.   Musculoskeletal: Negative for muscle ache.   Skin: Negative for rash.   Allergic/Immunologic: Negative for seasonal allergies and asthma.   Hematologic/Lymphatic: Negative for swollen lymph nodes.       Objective:      Physical Exam   Constitutional: She is oriented to person, place, and time. She appears well-developed and well-nourished. She is cooperative.  Non-toxic appearance. She does not have a sickly appearance. She does not appear ill. No distress.   HENT:   Head: Normocephalic and atraumatic.   Right Ear: Hearing, external ear and ear canal normal. Tympanic membrane is erythematous.   Left Ear: Hearing, tympanic membrane, external ear and ear canal normal.   Nose: Mucosal edema present. No " rhinorrhea or nasal deformity. No epistaxis. Right sinus exhibits no maxillary sinus tenderness and no frontal sinus tenderness. Left sinus exhibits no maxillary sinus tenderness and no frontal sinus tenderness.   Mouth/Throat: Uvula is midline and mucous membranes are normal. No trismus in the jaw. Normal dentition. No uvula swelling. Posterior oropharyngeal erythema (mild with pnd) present. No oropharyngeal exudate or posterior oropharyngeal edema.   Eyes: Conjunctivae, EOM and lids are normal. Pupils are equal, round, and reactive to light. No scleral icterus.   Sclera clear bilat   Neck: Trachea normal, full passive range of motion without pain and phonation normal. Neck supple. No neck rigidity. No edema and no erythema present.   Cardiovascular: Normal rate, regular rhythm, normal heart sounds, intact distal pulses and normal pulses.   Pulmonary/Chest: Effort normal and breath sounds normal. No respiratory distress. She has no decreased breath sounds. She has no wheezes. She has no rhonchi. She has no rales.   Abdominal: Soft. Normal appearance and bowel sounds are normal. She exhibits no distension. There is no tenderness.   Musculoskeletal: Normal range of motion. She exhibits no edema or deformity.   Lymphadenopathy:     She has no cervical adenopathy.   Neurological: She is alert and oriented to person, place, and time. She exhibits normal muscle tone. Coordination normal.   Skin: Skin is warm, dry and intact. She is not diaphoretic. No pallor.   Psychiatric: She has a normal mood and affect. Her speech is normal and behavior is normal. Judgment and thought content normal. Cognition and memory are normal.   Nursing note and vitals reviewed.      Assessment:       1. Right otitis media, unspecified otitis media type        Plan:         Right otitis media, unspecified otitis media type  -     cefdinir (OMNICEF) 300 MG capsule; Take 1 capsule (300 mg total) by mouth 2 (two) times daily. for 10 days   Dispense: 20 capsule; Refill: 0  -     dexamethasone injection 5 mg  -     cetirizine-pseudoephedrine 5-120 mg Tb12; Take 1 tablet by mouth once daily. for 14 days  Dispense: 14 tablet; Refill: 0      Patient Instructions   · Follow up with your primary care in 2-5 days if symptoms have not improved, or you may return here.  · If you were referred to a specialist, please follow up with that specialty.  · If you were prescribed antibiotics, please take them to completion.  · If you were prescribed a narcotic or any medication with sedative effects, do not drive or operate heavy equipment or machinery while taking these medications.  · You must understand that you have received treatment at an Urgent Care facility only, and that you may be released before all of your medical problems are known or treated. Urgent Care facilities are not equipped to handle life threatening emergencies. It is recommended that you go to an Emergency Department for further evaluation of worsening or concerning symptoms, or possibly life threatening conditions as discussed.                                        If you  smoke, please stop smoking        Symptomatic treatment:    Alternate tylenol and motrin every 4-6 hours  salt water gargles  Cold-eeze helps to reduce the duration of sore throat symptoms  Cepachol helps to numb the discomfort  Chloroseptic spray  Nasal saline spray reduces inflammation and dryness  Warm face compresses as often as you can  Vicks vapor rub at night  Flonase OTC or Nasacort OTC  Simple foods like chicken noodle soup help  Pedialyte helps with dehydration if lacking appetite  Rest as much as you can        Middle Ear Infection (Adult)  You have an infection of the middle ear, the space behind the eardrum. This is also called acute otitis media (AOM). Sometimes it is caused by the common cold. This is because congestion can block the internal passage (eustachian tube) that drains fluid from the middle ear.  When the middle ear fills with fluid, bacteria can grow there and cause an infection. Oral antibiotics are used to treat this illness, not ear drops. Symptoms usually start to improve within 1 to 2 days of treatment.    Home care  The following are general care guidelines:  · Finish all of the antibiotic medicine given, even though you may feel better after the first few days.  · You may use over-the-counter medicine, such as acetaminophen or ibuprofen, to control pain and fever, unless something else was prescribed. If you have chronic liver or kidney disease or have ever had a stomach ulcer or gastrointestinal bleeding, talk with your healthcare provider before using these medicines. Do not give aspirin to anyone under 18 years of age who has a fever. It may cause severe illness or death.  Follow-up care  Follow up with your healthcare provider, or as advised, in 2 weeks if all symptoms have not gotten better, or if hearing doesn't go back to normal within 1 month.  When to seek medical advice  Call your healthcare provider right away if any of these occur:  · Ear pain gets worse or does not improve after 3 days of treatment  · Unusual drowsiness or confusion  · Neck pain, stiff neck, or headache  · Fluid or blood draining from the ear canal  · Fever of 100.4°F (38°C) or as advised   · Seizure  Date Last Reviewed: 6/1/2016  © 3220-2504 [employer]. [address] [place] [address], [place] [address]. All rights reserved. This information is not intended as a substitute for professional medical care. Always follow your healthcare professional's instructions.

## 2019-01-15 NOTE — PATIENT INSTRUCTIONS
· Follow up with your primary care in 2-5 days if symptoms have not improved, or you may return here.  · If you were referred to a specialist, please follow up with that specialty.  · If you were prescribed antibiotics, please take them to completion.  · If you were prescribed a narcotic or any medication with sedative effects, do not drive or operate heavy equipment or machinery while taking these medications.  · You must understand that you have received treatment at an Urgent Care facility only, and that you may be released before all of your medical problems are known or treated. Urgent Care facilities are not equipped to handle life threatening emergencies. It is recommended that you go to an Emergency Department for further evaluation of worsening or concerning symptoms, or possibly life threatening conditions as discussed.                                        If you  smoke, please stop smoking        Symptomatic treatment:    Alternate tylenol and motrin every 4-6 hours  salt water gargles  Cold-eeze helps to reduce the duration of sore throat symptoms  Cepachol helps to numb the discomfort  Chloroseptic spray  Nasal saline spray reduces inflammation and dryness  Warm face compresses as often as you can  Vicks vapor rub at night  Flonase OTC or Nasacort OTC  Simple foods like chicken noodle soup help  Pedialyte helps with dehydration if lacking appetite  Rest as much as you can        Middle Ear Infection (Adult)  You have an infection of the middle ear, the space behind the eardrum. This is also called acute otitis media (AOM). Sometimes it is caused by the common cold. This is because congestion can block the internal passage (eustachian tube) that drains fluid from the middle ear. When the middle ear fills with fluid, bacteria can grow there and cause an infection. Oral antibiotics are used to treat this illness, not ear drops. Symptoms usually start to improve within 1 to 2 days of treatment.    Home  care  The following are general care guidelines:  · Finish all of the antibiotic medicine given, even though you may feel better after the first few days.  · You may use over-the-counter medicine, such as acetaminophen or ibuprofen, to control pain and fever, unless something else was prescribed. If you have chronic liver or kidney disease or have ever had a stomach ulcer or gastrointestinal bleeding, talk with your healthcare provider before using these medicines. Do not give aspirin to anyone under 18 years of age who has a fever. It may cause severe illness or death.  Follow-up care  Follow up with your healthcare provider, or as advised, in 2 weeks if all symptoms have not gotten better, or if hearing doesn't go back to normal within 1 month.  When to seek medical advice  Call your healthcare provider right away if any of these occur:  · Ear pain gets worse or does not improve after 3 days of treatment  · Unusual drowsiness or confusion  · Neck pain, stiff neck, or headache  · Fluid or blood draining from the ear canal  · Fever of 100.4°F (38°C) or as advised   · Seizure  Date Last Reviewed: 6/1/2016  © 9541-0766 [employer]. [address] [place] [address]. All rights reserved. This information is not intended as a substitute for professional medical care. Always follow your healthcare professional's instructions.

## 2019-03-13 ENCOUNTER — OFFICE VISIT (OUTPATIENT)
Dept: URGENT CARE | Facility: CLINIC | Age: 49
End: 2019-03-13
Payer: MEDICAID

## 2019-03-13 VITALS
HEART RATE: 83 BPM | TEMPERATURE: 98 F | BODY MASS INDEX: 27.97 KG/M2 | SYSTOLIC BLOOD PRESSURE: 118 MMHG | WEIGHT: 174 LBS | OXYGEN SATURATION: 98 % | DIASTOLIC BLOOD PRESSURE: 80 MMHG | HEIGHT: 66 IN

## 2019-03-13 DIAGNOSIS — J02.9 ACUTE PHARYNGITIS, UNSPECIFIED ETIOLOGY: Primary | ICD-10-CM

## 2019-03-13 PROCEDURE — 99214 OFFICE O/P EST MOD 30 MIN: CPT | Mod: S$GLB,,, | Performed by: FAMILY MEDICINE

## 2019-03-13 PROCEDURE — 99214 PR OFFICE/OUTPT VISIT, EST, LEVL IV, 30-39 MIN: ICD-10-PCS | Mod: S$GLB,,, | Performed by: FAMILY MEDICINE

## 2019-03-13 RX ORDER — OSELTAMIVIR PHOSPHATE 75 MG/1
75 CAPSULE ORAL 2 TIMES DAILY
Qty: 10 CAPSULE | Refills: 0 | Status: SHIPPED | OUTPATIENT
Start: 2019-03-13 | End: 2019-03-18

## 2019-03-13 RX ORDER — DEXAMETHASONE SODIUM PHOSPHATE 100 MG/10ML
5 INJECTION INTRAMUSCULAR; INTRAVENOUS
Status: COMPLETED | OUTPATIENT
Start: 2019-03-13 | End: 2019-03-13

## 2019-03-13 RX ADMIN — DEXAMETHASONE SODIUM PHOSPHATE 5 MG: 100 INJECTION INTRAMUSCULAR; INTRAVENOUS at 05:03

## 2019-03-13 NOTE — PATIENT INSTRUCTIONS
Please drink plenty of fluids.  Please get plenty of rest.  Please return here or go to the Emergency Department for any concerns or worsening of condition.  If you were given wait & see antibiotics, please wait 3-5 days before taking them, and only take them if your symptoms have worsened or not improved.  If you do begin taking the antibiotics, please take them to completion.  If you were prescribed antibiotics, please take them to completion.    If you were prescribed a narcotic medication, do not drive or operate heavy equipment or machinery while taking these medications.    You were given a decongestant (RESCON or POLY VENT Dm).  If your insurance does not cover it or you cannot afford it, it is ok to use the over the counter products listed below.  If you do not have Hypertension or any history of palpitations, it is ok to take over the counter Sudafed or Mucinex D or Allegra-D or Claritin-D or Zyrtec-D.  If you do take one of the above, it is ok to combine that with plain over the counter Mucinex or Allegra or Claritin or Zyrtec.  If for example you are taking Zyrtec -D, you can combine that with Mucinex, but not Mucinex-D.  If you are taking Mucinex-D, you can combine that with plain Allegra or Claritin or Zyrtec.   If you do have Hypertension or palpitations, it is safe to take Coricidin HBP for relief of sinus symptoms.    We recommend you take over the counter Flonase (Fluticasone) or another nasally inhaled steroid unless you are already taking one.      Nasal irrigation with a saline spray or Netti Pot like device per their directions is also recommended.  If not allergic, please take over the counter Tylenol (Acetaminophen)  as directed for control of pain and/or fever.  You may take Sudafed every 6 hours as directed for congestion.    You were given an injection of steroid.  This will relieve swelling and inflammation and improve respiratory symptoms.  It can raise your blood sugar if you are  diabetic.    Please follow up with your primary care doctor or specialist as needed.    Aram Meneses MD  [phone]    If you  smoke, please stop smoking.         Influenza (Adult)    Influenza is also called the flu. It is a viral illness that affects the air passages of your lungs. It is different from the common cold. The flu can easily be passed from one to person to another. It may be spread through the air by coughing and sneezing. Or it can be spread by touching the sick person and then touching your own eyes, nose, or mouth.  The flu starts 1 to 3 days after you are exposed to the flu virus. It may last for 1 to 2 weeks. You usually dont need to take antibiotics unless you have a complication. This might be an ear or sinus infection or pneumonia.  Symptoms of the flu may be mild or severe. They can include extreme tiredness (wanting to stay in bed all day), chills, fevers, muscle aches, soreness with eye movement, headache, and a dry, hacking cough.  Home care  Follow these guidelines when caring for yourself at home:  · Avoid being around cigarette smoke, whether yours or other peoples.  · Acetaminophen or ibuprofen will help ease your fever, muscle aches, and headache. Dont give aspirin to anyone younger than 18 who has the flu. Aspirin can harm the liver.  · Nausea and loss of appetite are common with the flu. Eat light meals. Drink 6 to 8 glasses of liquids every day. Good choices are water, sport drinks, soft drinks without caffeine, juices, tea, and soup. Extra fluids will also help loosen secretions in your nose and lungs.  · Over-the-counter cold medicines will not make the flu go away faster. But the medicines may help with coughing, sore throat, and congestion in your nose and sinuses. Dont use a decongestant if you have high blood pressure.  · Stay home until your fever has been gone for at least 24 hours without using medicine to reduce fever.  Follow-up care  Follow up with your  healthcare provider, or as advised, if you are not getting better over the next week.  If you are 65 or older, talk with your provider about getting a pneumococcal vaccine every 5 years. You should also get this vaccine if you have chronic asthma or COPD. All adults should get a flu vaccine every fall. Ask your provider about this.  When to seek medical advice  Call your healthcare provider right away if any of these occur:  · Cough with lots of colored sputum (mucus) or blood in your sputum  · Chest pain, shortness of breath, wheezing, or difficulty breathing  · Severe headache, or face, neck, or ear pain  · New rash with fever  · Fever of 100.4°F (38°C) or higher, or as directed by your healthcare provider  · Confusion, behavior change, or seizure  · Severe weakness or dizziness  · You get a fever or cough after getting better for a few days  Date Last Reviewed: 12/23/2014  © 9796-3041 [employer]. [address] [place] [address], [place] [address]. All rights reserved. This information is not intended as a substitute for professional medical care. Always follow your healthcare professional's instructions.

## 2019-03-13 NOTE — LETTER
March 13, 2019  Dalia Valentine   [address]  [address] [address]                [facility]  [address], [address]  [facility] [address]  Phone: [phone]  Fax: [phone] Dalia Valentine was seen and treated in our Urgent Care department   on 3/13/2019. She may return to work in 2 - 3 days.      If you have any questions or concerns, please don't hesitate to call.    Sincerely,        Eliu Kolb MD

## 2019-03-13 NOTE — PROGRESS NOTES
"Subjective:       Patient ID: Dalia Valentine is a 48 y.o. female.    Vitals:  height is 5' 6" (1.676 m) and weight is 78.9 kg (174 lb). Her oral temperature is 97.9 °F (36.6 °C). Her blood pressure is 118/80 and her pulse is 83. Her oxygen saturation is 98%.     Chief Complaint: Sore Throat    Sore Throat    This is a new problem. Episode onset: 4 days ago. The problem has been waxing and waning. There has been no fever. The pain is mild. Associated symptoms include ear pain (right) and swollen glands. Pertinent negatives include no congestion, coughing, diarrhea, headaches, shortness of breath or vomiting. Treatments tried: zyrtec, black elderberry. The treatment provided mild relief.       Constitution: Negative for chills, fatigue and fever.   HENT: Positive for ear pain (right), sore throat and voice change. Negative for congestion.    Neck: Negative for painful lymph nodes.   Cardiovascular: Negative for chest pain and leg swelling.   Eyes: Negative for double vision and blurred vision.   Respiratory: Negative for cough and shortness of breath.    Gastrointestinal: Negative for nausea, vomiting and diarrhea.   Genitourinary: Negative for dysuria, frequency, urgency and history of kidney stones.   Musculoskeletal: Negative for joint pain, joint swelling, muscle cramps and muscle ache.   Skin: Negative for color change, pale, rash and bruising.   Allergic/Immunologic: Negative for seasonal allergies.   Neurological: Negative for dizziness, history of vertigo, light-headedness, passing out and headaches.   Hematologic/Lymphatic: Negative for swollen lymph nodes.   Psychiatric/Behavioral: Negative for nervous/anxious, sleep disturbance and depression. The patient is not nervous/anxious.        Objective:      Physical Exam   Constitutional: She is oriented to person, place, and time. She appears well-developed and well-nourished. She is cooperative.  Non-toxic appearance. She does not appear ill. No " distress.   HENT:   Head: Normocephalic and atraumatic.   Right Ear: Hearing, tympanic membrane, external ear and ear canal normal.   Left Ear: Hearing, tympanic membrane, external ear and ear canal normal.   Nose: Nose normal. No mucosal edema, rhinorrhea or nasal deformity. No epistaxis. Right sinus exhibits no maxillary sinus tenderness and no frontal sinus tenderness. Left sinus exhibits no maxillary sinus tenderness and no frontal sinus tenderness.   Mouth/Throat: Uvula is midline and mucous membranes are normal. No trismus in the jaw. Normal dentition. No uvula swelling. Posterior oropharyngeal edema and posterior oropharyngeal erythema present.   Eyes: Conjunctivae and lids are normal. No scleral icterus.   Neck: Trachea normal, full passive range of motion without pain and phonation normal. Neck supple.   Cardiovascular: Normal rate, regular rhythm, normal heart sounds, intact distal pulses and normal pulses.   Pulmonary/Chest: Effort normal and breath sounds normal. No respiratory distress.   Abdominal: Soft. Normal appearance and bowel sounds are normal. She exhibits no distension. There is no tenderness.   Musculoskeletal: Normal range of motion. She exhibits no edema or deformity.   Neurological: She is alert and oriented to person, place, and time. She exhibits normal muscle tone. Coordination normal.   Skin: Skin is warm, dry and intact. She is not diaphoretic. No pallor.   Psychiatric: She has a normal mood and affect. Her speech is normal and behavior is normal. Judgment and thought content normal. Cognition and memory are normal.   Nursing note and vitals reviewed.      Assessment:       1. Acute pharyngitis, unspecified etiology        Plan:         Acute pharyngitis, unspecified etiology  -     oseltamivir (TAMIFLU) 75 MG capsule; Take 1 capsule (75 mg total) by mouth 2 (two) times daily. for 5 days  Dispense: 10 capsule; Refill: 0  -     pseudoephedrine-DM-guaifenesin (POLY-VENT DM) 60- mg  Tab; Take 1 tablet by mouth every 6 (six) hours as needed.  Dispense: 20 tablet; Refill: 0  -     dexamethasone injection 5 mg    Please drink plenty of fluids.  Please get plenty of rest.  Please return here or go to the Emergency Department for any concerns or worsening of condition.  If you were given wait & see antibiotics, please wait 3-5 days before taking them, and only take them if your symptoms have worsened or not improved.  If you do begin taking the antibiotics, please take them to completion.  If you were prescribed antibiotics, please take them to completion.    If you were prescribed a narcotic medication, do not drive or operate heavy equipment or machinery while taking these medications.    You were given a decongestant (RESCON or POLY VENT Dm).  If your insurance does not cover it or you cannot afford it, it is ok to use the over the counter products listed below.  If you do not have Hypertension or any history of palpitations, it is ok to take over the counter Sudafed or Mucinex D or Allegra-D or Claritin-D or Zyrtec-D.  If you do take one of the above, it is ok to combine that with plain over the counter Mucinex or Allegra or Claritin or Zyrtec.  If for example you are taking Zyrtec -D, you can combine that with Mucinex, but not Mucinex-D.  If you are taking Mucinex-D, you can combine that with plain Allegra or Claritin or Zyrtec.   If you do have Hypertension or palpitations, it is safe to take Coricidin HBP for relief of sinus symptoms.    We recommend you take over the counter Flonase (Fluticasone) or another nasally inhaled steroid unless you are already taking one.      Nasal irrigation with a saline spray or Netti Pot like device per their directions is also recommended.  If not allergic, please take over the counter Tylenol (Acetaminophen)  as directed for control of pain and/or fever.  You may take Sudafed every 6 hours as directed for congestion.    You were given an injection of  steroid.  This will relieve swelling and inflammation and improve respiratory symptoms.  It can raise your blood sugar if you are diabetic.    Please follow up with your primary care doctor or specialist as needed.    Aram Meneses MD  [phone]    If you  smoke, please stop smoking.

## 2019-05-20 ENCOUNTER — OFFICE VISIT (OUTPATIENT)
Dept: URGENT CARE | Facility: CLINIC | Age: 49
End: 2019-05-20
Payer: MEDICAID

## 2019-05-20 VITALS
BODY MASS INDEX: 29.41 KG/M2 | OXYGEN SATURATION: 100 % | HEIGHT: 66 IN | DIASTOLIC BLOOD PRESSURE: 100 MMHG | HEART RATE: 69 BPM | TEMPERATURE: 96 F | WEIGHT: 183 LBS | SYSTOLIC BLOOD PRESSURE: 151 MMHG | RESPIRATION RATE: 18 BRPM
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
## 2019-05-20 PROCEDURE — 99214 PR OFFICE/OUTPT VISIT, EST, LEVL IV, 30-39 MIN: ICD-10-PCS | Mod: S$GLB,,, | Performed by: PHYSICIAN ASSISTANT

## 2019-05-20 PROCEDURE — 99214 OFFICE O/P EST MOD 30 MIN: CPT | Mod: S$GLB,,, | Performed by: PHYSICIAN ASSISTANT

## 2019-05-20 RX ORDER — ALBUTEROL SULFATE 90 UG/1
AEROSOL, METERED RESPIRATORY (INHALATION)
COMMUNITY

## 2019-05-20 RX ORDER — DICLOFENAC SODIUM 25 MG/1
25 TABLET, DELAYED RELEASE ORAL 3 TIMES DAILY
COMMUNITY
End: 2020-08-30

## 2019-05-20 RX ORDER — CETIRIZINE HYDROCHLORIDE 10 MG/1
TABLET ORAL
COMMUNITY
Start: 2019-05-12

## 2019-05-20 RX ORDER — TRIAMCINOLONE ACETONIDE 1 MG/G
CREAM TOPICAL 2 TIMES DAILY
COMMUNITY

## 2019-05-20 RX ORDER — SULFAMETHOXAZOLE AND TRIMETHOPRIM 800; 160 MG/1; MG/1
1 TABLET ORAL 2 TIMES DAILY
Qty: 20 TABLET | Refills: 0 | Status: SHIPPED | OUTPATIENT
Start: 2019-05-20 | End: 2019-05-30

## 2019-05-20 NOTE — PATIENT INSTRUCTIONS
· Follow up with your primary care in 2-5 days if symptoms have not improved, or you may return here.  · If you were referred to a specialist, please follow up with that specialty.  · If you were prescribed antibiotics, please take them to completion.  · If you were prescribed a narcotic or any medication with sedative effects, do not drive or operate heavy equipment or machinery while taking these medications.  · You must understand that you have received treatment at an Urgent Care facility only, and that you may be released before all of your medical problems are known or treated. Urgent Care facilities are not equipped to handle life threatening emergencies. It is recommended that you go to an Emergency Department for further evaluation of worsening or concerning symptoms, or possibly life threatening conditions as discussed.                                        If you  smoke, please stop smoking        Infected Insect Bite or Sting    When an insect stings you, it injects venom. When an insect bites you, it does not. Stings and bites may cause a local reaction. Or they may cause a reaction that affects your whole body. Bites and stings may become infected. Signs of infection include redness, warmth, pain, drainage of pus, and swelling. Infections will need treatment with antibiotics and should get better over the next 10 days.  Home care  The following will help you care for your bite or sting at home:  · If a stinger is still in your skin, it will need to be removed. Don't use tweezers. Gently scrape the stinger from the side with a firm object such as the side of a credit card. This will loosen it and remove it from your skin.  · If itching is a problem, applying ice packs to the sting area will help.  · Wash the area with soap and water at least 3 times a day. Apply a topical antibiotic cream or ointment.  · You can use an over-the counter antihistamine unless your doctor has given you a prescription  antihistamine. You may use antihistamines to reduce itching if large areas of the skin are involved. Use lower doses during the daytime and higher doses at bedtime since the drug may make you sleepy. Don't use an antihistamine if you have glaucoma or if you are a man with trouble urinating due to an enlarged prostate. Some antihistamines cause less drowsiness and are a good choice for daytime use.  · If oral antibiotics have been prescribed, be sure to take them as directed until they are all finished.  · You may use over-the-counter pain medicine to control pain, unless another pain medicine was prescribed. Talk with your doctor before using acetaminophen or ibuprofen if you have chronic liver or kidney disease. Also talk with your doctor if you have ever had a stomach ulcer or gastrointestinal bleeding.  Follow-up care  Follow up with your healthcare provider, or as advised if you don't get better over the next 2 days or if your symptoms get worse.  Call 911  Call 911 if any of these occur:  · Swelling of the face, eyelids, mouth, throat, or tongue  · Difficulty swallowing or breathing  When to seek medical advice  Call your healthcare provider right away if any of these occur:  · Spreading areas of redness or swelling  · Fever of 100.4°F (38°C) or higher, or as directed by your healthcare provider  · Increased local pain  · Headache, fever, chills, muscle or joint aching, or vomiting,  · New rash  Date Last Reviewed: 10/1/2016  © 5971-1508 [employer]. [address] [place] [address], [place] [address]. All rights reserved. This information is not intended as a substitute for professional medical care. Always follow your healthcare professional's instructions.

## 2019-05-20 NOTE — PROGRESS NOTES
"Subjective:       Patient ID: Dalia Valentine is a 48 y.o. female.    Vitals:  height is 5' 6" (1.676 m) and weight is 83 kg (183 lb). Her tympanic temperature is 96 °F (35.6 °C). Her blood pressure is 151/100 (abnormal) and her pulse is 69. Her respiration is 18 and oxygen saturation is 100%.     Chief Complaint: Insect Bite (Sand Fly)    Insect Bite   This is a new problem. Episode onset: 3 days ago. The problem occurs constantly. The problem has been gradually worsening. Pertinent negatives include no abdominal pain, chest pain, congestion, coughing, fever, headaches, joint swelling, myalgias, nausea, neck pain, sore throat, swollen glands, urinary symptoms, vertigo, visual change, vomiting or weakness. Associated symptoms comments: Leg pain. Nothing aggravates the symptoms. Treatments tried: Neosporin, Kenalog cream. The treatment provided no relief.       Constitution: Negative for fever.   HENT: Negative for congestion and sore throat.    Neck: Negative for neck pain.   Cardiovascular: Negative for chest pain.   Respiratory: Negative for cough.    Gastrointestinal: Negative for abdominal pain, nausea and vomiting.   Musculoskeletal: Negative for joint swelling and muscle ache.   Skin: Positive for erythema. Negative for hives.   Allergic/Immunologic: Positive for itching. Negative for hives.   Neurological: Negative for history of vertigo and headaches.       Objective:      Physical Exam   Constitutional: She is oriented to person, place, and time. She appears well-developed and well-nourished.   HENT:   Head: Normocephalic and atraumatic. Head is without abrasion, without contusion and without laceration.   Right Ear: External ear normal.   Left Ear: External ear normal.   Nose: Nose normal.   Mouth/Throat: Oropharynx is clear and moist.   Eyes: Pupils are equal, round, and reactive to light. Conjunctivae, EOM and lids are normal.   Neck: Trachea normal, full passive range of motion without pain " and phonation normal. Neck supple.   Cardiovascular: Normal rate, regular rhythm and normal heart sounds.   Pulmonary/Chest: Effort normal and breath sounds normal. No stridor. No respiratory distress.   Musculoskeletal: Normal range of motion.   Neurological: She is alert and oriented to person, place, and time.   Skin: Skin is warm, dry and intact. Capillary refill takes less than 2 seconds. No abrasion, no bruising, no burn, no ecchymosis, no laceration, no lesion and no rash noted. There is erythema.        Psychiatric: She has a normal mood and affect. Her speech is normal and behavior is normal. Judgment and thought content normal. Cognition and memory are normal.   Nursing note and vitals reviewed.      Assessment:       1. Bug bite with infection, initial encounter        Plan:         Bug bite with infection, initial encounter  -     sulfamethoxazole-trimethoprim 800-160mg (BACTRIM DS) 800-160 mg Tab; Take 1 tablet by mouth 2 (two) times daily. for 10 days  Dispense: 20 tablet; Refill: 0      Patient Instructions   · Follow up with your primary care in 2-5 days if symptoms have not improved, or you may return here.  · If you were referred to a specialist, please follow up with that specialty.  · If you were prescribed antibiotics, please take them to completion.  · If you were prescribed a narcotic or any medication with sedative effects, do not drive or operate heavy equipment or machinery while taking these medications.  · You must understand that you have received treatment at an Urgent Care facility only, and that you may be released before all of your medical problems are known or treated. Urgent Care facilities are not equipped to handle life threatening emergencies. It is recommended that you go to an Emergency Department for further evaluation of worsening or concerning symptoms, or possibly life threatening conditions as discussed.                                        If you  smoke, please stop  smoking        Infected Insect Bite or Sting    When an insect stings you, it injects venom. When an insect bites you, it does not. Stings and bites may cause a local reaction. Or they may cause a reaction that affects your whole body. Bites and stings may become infected. Signs of infection include redness, warmth, pain, drainage of pus, and swelling. Infections will need treatment with antibiotics and should get better over the next 10 days.  Home care  The following will help you care for your bite or sting at home:  · If a stinger is still in your skin, it will need to be removed. Don't use tweezers. Gently scrape the stinger from the side with a firm object such as the side of a credit card. This will loosen it and remove it from your skin.  · If itching is a problem, applying ice packs to the sting area will help.  · Wash the area with soap and water at least 3 times a day. Apply a topical antibiotic cream or ointment.  · You can use an over-the counter antihistamine unless your doctor has given you a prescription antihistamine. You may use antihistamines to reduce itching if large areas of the skin are involved. Use lower doses during the daytime and higher doses at bedtime since the drug may make you sleepy. Don't use an antihistamine if you have glaucoma or if you are a man with trouble urinating due to an enlarged prostate. Some antihistamines cause less drowsiness and are a good choice for daytime use.  · If oral antibiotics have been prescribed, be sure to take them as directed until they are all finished.  · You may use over-the-counter pain medicine to control pain, unless another pain medicine was prescribed. Talk with your doctor before using acetaminophen or ibuprofen if you have chronic liver or kidney disease. Also talk with your doctor if you have ever had a stomach ulcer or gastrointestinal bleeding.  Follow-up care  Follow up with your healthcare provider, or as advised if you don't get better  over the next 2 days or if your symptoms get worse.  Call 911  Call 911 if any of these occur:  · Swelling of the face, eyelids, mouth, throat, or tongue  · Difficulty swallowing or breathing  When to seek medical advice  Call your healthcare provider right away if any of these occur:  · Spreading areas of redness or swelling  · Fever of 100.4°F (38°C) or higher, or as directed by your healthcare provider  · Increased local pain  · Headache, fever, chills, muscle or joint aching, or vomiting,  · New rash  Date Last Reviewed: 10/1/2016  © 1747-6049 [employer]. [address] [place] [address]. All rights reserved. This information is not intended as a substitute for professional medical care. Always follow your healthcare professional's instructions.

## 2019-05-23 ENCOUNTER — TELEPHONE (OUTPATIENT)
Dept: URGENT CARE | Facility: CLINIC | Age: 49
End: 2019-05-23

## 2019-05-23 NOTE — TELEPHONE ENCOUNTER
Left message checking on pt from DOC 5/20 and provided the clinic phone number for the pt to call back if they have any questions.

## 2019-07-02 PROBLEM — M54.31 SCIATICA OF RIGHT SIDE: Status: ACTIVE | Noted: 2019-07-02

## 2019-07-02 PROBLEM — M54.16 LUMBAR RADICULOPATHY, ACUTE: Status: ACTIVE | Noted: 2019-07-02

## 2019-07-02 PROBLEM — M79.671 FOOT PAIN, RIGHT: Status: ACTIVE | Noted: 2019-07-02

## 2020-10-12 ENCOUNTER — HOSPITAL ENCOUNTER (OUTPATIENT)
Dept: RADIOLOGY | Facility: HOSPITAL | Age: 50
Discharge: HOME | End: 2020-10-12
Attending: NURSE PRACTITIONER
Payer: MEDICAID

## 2020-10-12 DIAGNOSIS — M54.2 CERVICALGIA: ICD-10-CM

## 2020-10-12 DIAGNOSIS — M54.2 CERVICALGIA: Primary | ICD-10-CM

## 2020-10-12 DIAGNOSIS — M25.511 ACUTE PAIN OF RIGHT SHOULDER: ICD-10-CM

## 2020-10-12 PROCEDURE — 72040 X-RAY EXAM NECK SPINE 2-3 VW: CPT | Mod: TC

## 2020-10-12 PROCEDURE — 73030 X-RAY EXAM OF SHOULDER: CPT | Mod: TC,RT

## 2021-12-30 ENCOUNTER — OFFICE VISIT (OUTPATIENT)
Dept: URGENT CARE | Facility: CLINIC | Age: 51
End: 2021-12-30
Payer: MEDICAID

## 2021-12-30 VITALS
HEIGHT: 66 IN | OXYGEN SATURATION: 97 % | RESPIRATION RATE: 20 BRPM | TEMPERATURE: 99 F | SYSTOLIC BLOOD PRESSURE: 128 MMHG | WEIGHT: 174 LBS | HEART RATE: 104 BPM | BODY MASS INDEX: 27.97 KG/M2 | DIASTOLIC BLOOD PRESSURE: 90 MMHG

## 2021-12-30 DIAGNOSIS — R09.81 NASAL CONGESTION: ICD-10-CM

## 2021-12-30 DIAGNOSIS — R05.9 COUGH: ICD-10-CM

## 2021-12-30 DIAGNOSIS — J06.9 ACUTE URI: Primary | ICD-10-CM

## 2021-12-30 LAB
CTP QC/QA: YES
CTP QC/QA: YES
POC MOLECULAR INFLUENZA A AGN: NEGATIVE
POC MOLECULAR INFLUENZA B AGN: NEGATIVE
SARS-COV-2 RDRP RESP QL NAA+PROBE: NEGATIVE

## 2021-12-30 PROCEDURE — 3080F DIAST BP >= 90 MM HG: CPT | Mod: CPTII,S$GLB,, | Performed by: NURSE PRACTITIONER

## 2021-12-30 PROCEDURE — 1160F PR REVIEW ALL MEDS BY PRESCRIBER/CLIN PHARMACIST DOCUMENTED: ICD-10-PCS | Mod: CPTII,S$GLB,, | Performed by: NURSE PRACTITIONER

## 2021-12-30 PROCEDURE — 3008F BODY MASS INDEX DOCD: CPT | Mod: CPTII,S$GLB,, | Performed by: NURSE PRACTITIONER

## 2021-12-30 PROCEDURE — U0002 COVID-19 LAB TEST NON-CDC: HCPCS | Mod: QW,S$GLB,, | Performed by: NURSE PRACTITIONER

## 2021-12-30 PROCEDURE — 3008F PR BODY MASS INDEX (BMI) DOCUMENTED: ICD-10-PCS | Mod: CPTII,S$GLB,, | Performed by: NURSE PRACTITIONER

## 2021-12-30 PROCEDURE — 3074F PR MOST RECENT SYSTOLIC BLOOD PRESSURE < 130 MM HG: ICD-10-PCS | Mod: CPTII,S$GLB,, | Performed by: NURSE PRACTITIONER

## 2021-12-30 PROCEDURE — 87502 INFLUENZA DNA AMP PROBE: CPT | Mod: QW,S$GLB,, | Performed by: NURSE PRACTITIONER

## 2021-12-30 PROCEDURE — 3074F SYST BP LT 130 MM HG: CPT | Mod: CPTII,S$GLB,, | Performed by: NURSE PRACTITIONER

## 2021-12-30 PROCEDURE — 1160F RVW MEDS BY RX/DR IN RCRD: CPT | Mod: CPTII,S$GLB,, | Performed by: NURSE PRACTITIONER

## 2021-12-30 PROCEDURE — 1159F MED LIST DOCD IN RCRD: CPT | Mod: CPTII,S$GLB,, | Performed by: NURSE PRACTITIONER

## 2021-12-30 PROCEDURE — 1159F PR MEDICATION LIST DOCUMENTED IN MEDICAL RECORD: ICD-10-PCS | Mod: CPTII,S$GLB,, | Performed by: NURSE PRACTITIONER

## 2021-12-30 PROCEDURE — 87502 POCT INFLUENZA A/B MOLECULAR: ICD-10-PCS | Mod: QW,S$GLB,, | Performed by: NURSE PRACTITIONER

## 2021-12-30 PROCEDURE — 99214 OFFICE O/P EST MOD 30 MIN: CPT | Mod: S$GLB,,, | Performed by: NURSE PRACTITIONER

## 2021-12-30 PROCEDURE — U0002: ICD-10-PCS | Mod: QW,S$GLB,, | Performed by: NURSE PRACTITIONER

## 2021-12-30 PROCEDURE — 3080F PR MOST RECENT DIASTOLIC BLOOD PRESSURE >= 90 MM HG: ICD-10-PCS | Mod: CPTII,S$GLB,, | Performed by: NURSE PRACTITIONER

## 2021-12-30 PROCEDURE — 99214 PR OFFICE/OUTPT VISIT, EST, LEVL IV, 30-39 MIN: ICD-10-PCS | Mod: S$GLB,,, | Performed by: NURSE PRACTITIONER

## 2021-12-30 RX ORDER — PREDNISOLONE SODIUM PHOSPHATE 15 MG/5ML
15 SOLUTION ORAL DAILY
Qty: 25 ML | Refills: 0 | Status: SHIPPED | OUTPATIENT
Start: 2021-12-30 | End: 2022-01-04

## 2021-12-30 RX ORDER — AZITHROMYCIN 200 MG/5ML
POWDER, FOR SUSPENSION ORAL
Qty: 12.5 ML | Refills: 0 | Status: SHIPPED | OUTPATIENT
Start: 2021-12-30 | End: 2022-01-04

## 2021-12-30 RX ORDER — PROMETHAZINE HYDROCHLORIDE AND DEXTROMETHORPHAN HYDROBROMIDE 6.25; 15 MG/5ML; MG/5ML
5 SYRUP ORAL NIGHTLY PRN
Qty: 120 ML | Refills: 0 | Status: SHIPPED | OUTPATIENT
Start: 2021-12-30 | End: 2022-01-02

## 2022-01-06 ENCOUNTER — PATIENT MESSAGE (OUTPATIENT)
Dept: ADMINISTRATIVE | Facility: OTHER | Age: 52
End: 2022-01-06
Payer: MEDICAID

## 2022-05-10 ENCOUNTER — OFFICE VISIT (OUTPATIENT)
Dept: URGENT CARE | Facility: CLINIC | Age: 52
End: 2022-05-10
Payer: MEDICAID

## 2022-05-10 VITALS
TEMPERATURE: 98 F | OXYGEN SATURATION: 98 % | HEIGHT: 66 IN | SYSTOLIC BLOOD PRESSURE: 131 MMHG | DIASTOLIC BLOOD PRESSURE: 84 MMHG | HEART RATE: 70 BPM | RESPIRATION RATE: 18 BRPM | WEIGHT: 184 LBS | BODY MASS INDEX: 29.57 KG/M2

## 2022-05-10 DIAGNOSIS — M79.89 SWELLING OF RIGHT HAND: Primary | ICD-10-CM

## 2022-05-10 PROCEDURE — 3079F DIAST BP 80-89 MM HG: CPT | Mod: CPTII,S$GLB,, | Performed by: PHYSICIAN ASSISTANT

## 2022-05-10 PROCEDURE — 73130 X-RAY EXAM OF HAND: CPT | Mod: RT,S$GLB,, | Performed by: RADIOLOGY

## 2022-05-10 PROCEDURE — 1159F PR MEDICATION LIST DOCUMENTED IN MEDICAL RECORD: ICD-10-PCS | Mod: CPTII,S$GLB,, | Performed by: PHYSICIAN ASSISTANT

## 2022-05-10 PROCEDURE — 3008F BODY MASS INDEX DOCD: CPT | Mod: CPTII,S$GLB,, | Performed by: PHYSICIAN ASSISTANT

## 2022-05-10 PROCEDURE — 3075F SYST BP GE 130 - 139MM HG: CPT | Mod: CPTII,S$GLB,, | Performed by: PHYSICIAN ASSISTANT

## 2022-05-10 PROCEDURE — 73130 XR HAND COMPLETE 3 VIEW RIGHT: ICD-10-PCS | Mod: RT,S$GLB,, | Performed by: RADIOLOGY

## 2022-05-10 PROCEDURE — 1159F MED LIST DOCD IN RCRD: CPT | Mod: CPTII,S$GLB,, | Performed by: PHYSICIAN ASSISTANT

## 2022-05-10 PROCEDURE — 99214 OFFICE O/P EST MOD 30 MIN: CPT | Mod: S$GLB,,, | Performed by: PHYSICIAN ASSISTANT

## 2022-05-10 PROCEDURE — 99214 PR OFFICE/OUTPT VISIT, EST, LEVL IV, 30-39 MIN: ICD-10-PCS | Mod: S$GLB,,, | Performed by: PHYSICIAN ASSISTANT

## 2022-05-10 PROCEDURE — 1160F RVW MEDS BY RX/DR IN RCRD: CPT | Mod: CPTII,S$GLB,, | Performed by: PHYSICIAN ASSISTANT

## 2022-05-10 PROCEDURE — 1160F PR REVIEW ALL MEDS BY PRESCRIBER/CLIN PHARMACIST DOCUMENTED: ICD-10-PCS | Mod: CPTII,S$GLB,, | Performed by: PHYSICIAN ASSISTANT

## 2022-05-10 PROCEDURE — 3008F PR BODY MASS INDEX (BMI) DOCUMENTED: ICD-10-PCS | Mod: CPTII,S$GLB,, | Performed by: PHYSICIAN ASSISTANT

## 2022-05-10 PROCEDURE — 3075F PR MOST RECENT SYSTOLIC BLOOD PRESS GE 130-139MM HG: ICD-10-PCS | Mod: CPTII,S$GLB,, | Performed by: PHYSICIAN ASSISTANT

## 2022-05-10 PROCEDURE — 3079F PR MOST RECENT DIASTOLIC BLOOD PRESSURE 80-89 MM HG: ICD-10-PCS | Mod: CPTII,S$GLB,, | Performed by: PHYSICIAN ASSISTANT

## 2022-05-10 RX ORDER — PREGABALIN 50 MG/1
50 CAPSULE ORAL 3 TIMES DAILY
COMMUNITY
Start: 2022-03-24

## 2022-05-10 NOTE — PROGRESS NOTES
"Subjective:       Patient ID: Dalia Valentine is a 51 y.o. female.    Vitals:  height is 5' 6" (1.676 m) and weight is 83.5 kg (184 lb). Her tympanic temperature is 98.2 °F (36.8 °C). Her blood pressure is 131/84 and her pulse is 70. Her respiration is 18 and oxygen saturation is 98%.     Chief Complaint: swollen hand    Reports that she writes with her left hand but does most other things with her left hand. Reports right hand swelling and pain x 1 week. Treating with diclofenac gel and taking lyrica. Reports h/o arthritis and fibromyalgia. She does see rheumatology    Other  This is a new problem. Episode onset: 10 days  The problem occurs constantly. The problem has been gradually improving. Associated symptoms include joint swelling. Pertinent negatives include no nausea, numbness or vomiting. Associated symptoms comments: Pt states she has trouble closing her right hand, no injury, pt states her middle finger and ringer finger are the two fingers that hurt the most.. Exacerbated by: movement. Treatments tried: lyrica, diclofenac. The treatment provided mild relief.       Gastrointestinal: Negative for nausea and vomiting.   Musculoskeletal: Positive for joint swelling.   Neurological: Negative for numbness.       Objective:      Physical Exam   Constitutional: She is oriented to person, place, and time. She appears well-developed. She is cooperative.  Non-toxic appearance. She does not appear ill. No distress.   HENT:   Head: Normocephalic and atraumatic.   Ears:   Right Ear: Hearing normal.   Left Ear: Hearing normal.   Eyes: Conjunctivae and lids are normal. No scleral icterus.   Neck: Phonation normal. Neck supple.   Cardiovascular: Normal rate.   Pulmonary/Chest: Effort normal. No respiratory distress.   Abdominal: Normal appearance.   Musculoskeletal:      Right hand: She exhibits tenderness and swelling. She exhibits normal two-point discrimination, normal capillary refill and no deformity. " Normal sensation noted.        Hands:    Neurological: She is alert and oriented to person, place, and time. Coordination normal.   Skin: Skin is intact, not diaphoretic and not pale.   Psychiatric: Her speech is normal and behavior is normal. Judgment and thought content normal.   Nursing note and vitals reviewed.        Assessment:       1. Swelling of right hand          Plan:         Swelling of right hand  -     XR HAND COMPLETE 3 VIEW RIGHT; Future; Expected date: 05/10/2022    no acute fracture or dislocation to the right hand on xray    Recommend close f/u with your Rheumatologist. Continue current home medications. Discussed with patient the importance of f/u with their primary care provider. Urged to go to the ER for any worsening signs or symptoms.

## 2022-06-25 ENCOUNTER — OFFICE VISIT (OUTPATIENT)
Dept: URGENT CARE | Facility: CLINIC | Age: 52
End: 2022-06-25
Payer: MEDICAID

## 2022-06-25 VITALS
HEIGHT: 66 IN | DIASTOLIC BLOOD PRESSURE: 93 MMHG | WEIGHT: 192 LBS | TEMPERATURE: 99 F | BODY MASS INDEX: 30.86 KG/M2 | RESPIRATION RATE: 18 BRPM | SYSTOLIC BLOOD PRESSURE: 137 MMHG | OXYGEN SATURATION: 96 % | HEART RATE: 84 BPM

## 2022-06-25 DIAGNOSIS — H66.002 ACUTE SUPPURATIVE OTITIS MEDIA OF LEFT EAR WITHOUT SPONTANEOUS RUPTURE OF TYMPANIC MEMBRANE, RECURRENCE NOT SPECIFIED: ICD-10-CM

## 2022-06-25 DIAGNOSIS — U07.1 COVID-19 VIRUS INFECTION: Primary | ICD-10-CM

## 2022-06-25 DIAGNOSIS — J06.0 LARYNGOPHARYNGITIS ACUTE: ICD-10-CM

## 2022-06-25 LAB
CTP QC/QA: YES
SARS-COV-2 RDRP RESP QL NAA+PROBE: POSITIVE

## 2022-06-25 PROCEDURE — 99214 PR OFFICE/OUTPT VISIT, EST, LEVL IV, 30-39 MIN: ICD-10-PCS | Mod: S$GLB,,, | Performed by: NURSE PRACTITIONER

## 2022-06-25 PROCEDURE — 1160F PR REVIEW ALL MEDS BY PRESCRIBER/CLIN PHARMACIST DOCUMENTED: ICD-10-PCS | Mod: CPTII,S$GLB,, | Performed by: NURSE PRACTITIONER

## 2022-06-25 PROCEDURE — U0002 COVID-19 LAB TEST NON-CDC: HCPCS | Mod: QW,S$GLB,, | Performed by: NURSE PRACTITIONER

## 2022-06-25 PROCEDURE — 3075F PR MOST RECENT SYSTOLIC BLOOD PRESS GE 130-139MM HG: ICD-10-PCS | Mod: CPTII,S$GLB,, | Performed by: NURSE PRACTITIONER

## 2022-06-25 PROCEDURE — 3008F BODY MASS INDEX DOCD: CPT | Mod: CPTII,S$GLB,, | Performed by: NURSE PRACTITIONER

## 2022-06-25 PROCEDURE — U0002: ICD-10-PCS | Mod: QW,S$GLB,, | Performed by: NURSE PRACTITIONER

## 2022-06-25 PROCEDURE — 3008F PR BODY MASS INDEX (BMI) DOCUMENTED: ICD-10-PCS | Mod: CPTII,S$GLB,, | Performed by: NURSE PRACTITIONER

## 2022-06-25 PROCEDURE — 1159F PR MEDICATION LIST DOCUMENTED IN MEDICAL RECORD: ICD-10-PCS | Mod: CPTII,S$GLB,, | Performed by: NURSE PRACTITIONER

## 2022-06-25 PROCEDURE — 99214 OFFICE O/P EST MOD 30 MIN: CPT | Mod: S$GLB,,, | Performed by: NURSE PRACTITIONER

## 2022-06-25 PROCEDURE — 3080F PR MOST RECENT DIASTOLIC BLOOD PRESSURE >= 90 MM HG: ICD-10-PCS | Mod: CPTII,S$GLB,, | Performed by: NURSE PRACTITIONER

## 2022-06-25 PROCEDURE — 1160F RVW MEDS BY RX/DR IN RCRD: CPT | Mod: CPTII,S$GLB,, | Performed by: NURSE PRACTITIONER

## 2022-06-25 PROCEDURE — 3080F DIAST BP >= 90 MM HG: CPT | Mod: CPTII,S$GLB,, | Performed by: NURSE PRACTITIONER

## 2022-06-25 PROCEDURE — 1159F MED LIST DOCD IN RCRD: CPT | Mod: CPTII,S$GLB,, | Performed by: NURSE PRACTITIONER

## 2022-06-25 PROCEDURE — 3075F SYST BP GE 130 - 139MM HG: CPT | Mod: CPTII,S$GLB,, | Performed by: NURSE PRACTITIONER

## 2022-06-25 RX ORDER — DICLOFENAC SODIUM 50 MG/1
TABLET, DELAYED RELEASE ORAL
COMMUNITY
End: 2023-04-13

## 2022-06-25 RX ORDER — BENZONATATE 200 MG/1
200 CAPSULE ORAL 3 TIMES DAILY PRN
Qty: 30 CAPSULE | Refills: 0 | Status: SHIPPED | OUTPATIENT
Start: 2022-06-25 | End: 2023-02-02

## 2022-06-25 RX ORDER — BUPROPION HYDROCHLORIDE 300 MG/1
TABLET ORAL
COMMUNITY
End: 2023-02-02

## 2022-06-25 RX ORDER — LOSARTAN POTASSIUM AND HYDROCHLOROTHIAZIDE 12.5; 5 MG/1; MG/1
TABLET ORAL
COMMUNITY

## 2022-06-25 RX ORDER — CEFDINIR 300 MG/1
300 CAPSULE ORAL 2 TIMES DAILY
Qty: 14 CAPSULE | Refills: 0 | Status: SHIPPED | OUTPATIENT
Start: 2022-06-25 | End: 2022-07-02

## 2022-06-25 RX ORDER — DICLOFENAC SODIUM 10 MG/G
GEL TOPICAL
COMMUNITY
Start: 2021-05-01

## 2022-06-25 NOTE — PATIENT INSTRUCTIONS
Ear Infection    General Information    An ear infection can cause ear pain and fever. You might also have trouble hearing from fluid buildup in the middle ear behind the eardrum.  Most ear infections are caused by viruses, but some are caused by bacteria. The doctor will wait to see if you get better on your own if they think the cause is a virus. The doctor will order antibiotic if they think the cause is a bacteria. Antibiotics kill bacteria, but they do not work on viruses.  If the doctor orders antibiotics, be sure to take all of them, even if you start to feel better.  What care is needed at home?   Call your regular doctor to let them know you were at Urgent Care. Make a follow-up appointment if you were told to.  Do not put anything in your ear unless it was ordered by the doctor.  You may want to take medicines like ibuprofen, naproxen, or acetaminophen to help with pain.  When do I need to call the doctor?   Your symptoms are not getting better in 2 to 3 days.  You continue to have problems hearing after 2 to 3 weeks.  You have a fever of 100.4°F (38°C) or higher or chills.  You have discharge or fluid coming from your ear.  You have new or worsening symptoms.  Last Reviewed Date   2020-12-16  Consumer Information Use and Disclaimer   This information is not specific medical advice and does not replace information you receive from your health care provider. This is only a brief summary of general information. It does NOT include all information about conditions, illnesses, injuries, tests, procedures, treatments, therapies, discharge instructions or life-style choices that may apply to you. You must talk with your health care provider for complete information about your health and treatment options. This information should not be used to decide whether or not to accept your health care providers advice, instructions or recommendations. Only your health care provider has the knowledge and training to  provide advice that is right for you.  Copyright   Copyright © 2021 [employer] and its affiliates and/or licensors. All rights reserved.     If you tested positive for COVID-19 and do not have symptoms, you must isolate for 5 days starting on the day of the positive test. I     If you tested positive and have symptoms, you must isolate for 5 days starting on the day of the first symptoms,  not the day of the positive test.     IMPORTANT: both the CDC and the [facility] emphasize that you do not test out of isolation.    After 5 days, if your symptoms have improved and you have not had fever on day 5, you can return to the community on day 6- NO TESTING REQUIRED!      In fact, we do not retest if you were positive in the last 90 days.    After your 5 days of isolation are completed, the CDC recommends strict mask use for the first 5 days that you come out of isolation.    COVID 19-CORONA TREATMENT AS AN OUTPATIENT    1) Motrin/advil/ibuprofen- Take Two Tablets(200 mg) three Times a Day for 5 to 7 Days if over 90 pounds.If under 90 pounds take one motrin 200 mg once or an equivalent Childrens liquid dose for the patients weight and age.  2) Mucinex D 1/2 Tablet twice a day for 5 to 7 Days.If under 80 pounds take a 1/4 of a tablet or get the Childrens Liquid dose for the patients age and/or weight.      The following will also help lessen symptoms or to turn off the INFLAMMATORY PATHWAYS :  1)Vitamin C 500 to 1000 mg by mouth once a day.  2)B1 (Thiamine)(or a B Complex tablet-The B complex vitamin includes all the B vitamins) vitamin one tablet once a day .  3)Vitamin D 2000 to 5000 IU once a day   4)If older than 25 take Asprin 325 (or two 81 mg Asprin ) once a day for 1 to 2 months.  5)Melatonin 3 to 6 mg by mouth 2 hours before bedtime if you have trouble sleeping.(it will make you sleepy so do not drive after taking this medication)  6)Take zinc 75 to 100 mg by mouth every day .  7)Get a Pulse Oximeter to check the  oxygen in your blood and pulse,then Check your oxygen and  pulse 4 to 6 times a day if it drops below 95% go to the Emergency Room .If you see a pulse above 110 and your not stressed or have done heavy work/activitity and your oxygen is above 95% then you need to drink more fluids if there are no other symptoms.      The following are suggestions to help you with upper respiratory symptoms.    Congestion:    Nasal Saline  Nasal saline is available over the counter. There are several different commercial preparations such as Ocean spray and Ayr spray. There is no limit on the use of Nasal saline. Saline is used by snorting the mist up into the nose then later gently blowing the nose to get rid of any secretions that it has loosened.     Mucous Thinners and Decongestants  Mucous thinners and decongestants are used to shrink down the tissues and promote sinus drainage. There are multiple prescription and over-the-counter medications available. A mucous thinner will tend to be drying unless you are also drinking plenty of water when taking these. If you have high blood pressure, it is very important to monitor your pressure while on decongestants. The mucous thinner/decongestant combinations are typically given twice per day. However, some people will be unable to tolerate these at night and should only take them once per day.    Decongestant Nasal Sprays  Over-the-counter decongestant nasal spray such as Afrin, may be helpful as an initial step in treating upper respiratory infections. This spray can be used for up to approximately 3 days and is used no more than twice per day. Topical nasal decongestant spray for longer than 5 days will result in a physical addiction, in which the nasal lining will become significantly swollen and irritated until the spray is used again. May cause elevated blood pressure    Use pseudoephedrine (behind the counter) for sinus pressure and congestion- Pseudoephedrine 30 mg up to 240 mg  /day. Warning:  It can raise your blood pressure and give you palpitations, avoid with history of high blood pressure, palpitations, and severe cardiac disease.  Coricidin HBP is okay to use if you have high blood pressure.     Nasal Steroids  Nasal steroid medications such as Flonase are useful for upper respiratory infections, allergies, and sensitivities to airborne irritants. Unfortunately, they do not begin to work for 1-2 days, and they do not reach their maximum benefit for approximately 2-3 weeks. Initial therapy is typically 2 puffs per nostril twice per day. This should be used for only a few days, then the maintenance dosage is one or two puffs per nostril once per day. This can be done at any time of the day. The most effective way to use any nasal medication is to look down at your toes when spraying it in. Aim slightly away from the septum (dividing plate between the nostrils), and gently inhale. This ensures that the spray will go into the sinus cavities and not straight up into the nose. A good way to avoid spraying onto the septum is to use the right hand to spray into the left nostril, and vice versa for the right nostril. Occasionally, nasal steroids can increase the risk of nosebleeds, but in general they are very well tolerated. If you develop a bloody nose, stop using the medication immediately.    Clear Runny Nose/Allergic Rhinitis:  Use an antihistamine to help dry you out.   Antihistamines  Antihistamines are available both over-the-counter and as a prescription. There are also various decongestant and antihistamine combinations available such as Claritin, Allegra, and Zyrtec. It is best to take any antihistamine-decongestant combination in the morning to avoid insomnia. Zyrtec should probably be taken at night, in order to reduce the chance of sleepiness during the daytime. If there is a significant infection present and secretions are already thickened, it is recommended to discontinue  antihistamines and use a mucous thinner/decongestant combination.      Oral Steroids  Oral steroids can be used with more sever infections. Often, they are the only medications that will reduce the symptoms of pressure and allow the nasal sinuses to drain. These are best taken on a full stomach and earlier in the day is better. They may give you some irritability, stomach upset, or hyperactivity. This can also interfere with sleep.     A person who has high blood pressure or diabetes should be very careful to monitor their blood pressure or blood glucose while taking steroids. Steroids can have multiple side effects especially when taken long-term. Short-term doses are usually very well tolerated and extremely effective in controlling the symptoms associated with acute and chronic sinus infections and severe allergies. The use of steroids for greater than approximately seven days requires a tapering down in order to discontinue them. You should not abruptly stop your steroid if you have been taking the same dose for greater than one week.    Antibiotic Treatment  Finally, when all of these other measures have failed, and a bacterial infection is present, an antibiotic will be prescribed. The most common symptoms of acute sinusitis of a bacterial nature are pain, pressure, and thick and colored nasal drainage. However, not all colored drainage means that there is a bacterial infection present. According to the Center for Disease Control, only 2% of colds will progress to result in bacterial sinusitis. Most upper respiratory infections should NOT be treated with antibiotics. Antibiotics should be reserved for upper respiratory infections which last longer than 10 days, or which worsen after 5 to 7 days of treatment. The use of antibiotics for nonbacterial upper respiratory infections has resulted in a severe problem with the emergence of bacteria which are resistant to multiple forms of antibiotics, and some bacteria  are currently only treatable with intravenous antibiotics.    Body Aches/Pains/Fever  For patients who are not allergic to and are not on anticoagulants, you can alternate Tylenol every 4 hours and Motrin every 6 hours for fever above 100.4F and/or pain.  For patients who are allergic or intolerant to NSAIDS, have gastritis, gastric ulcers, or history of GI bleeds, are pregnant, or are on anticoagulant therapy, you can take Tylenol every 4 hours as needed for fever above 100.4F and/or pain.     Maintain adequate hydration -  Rest and keep yourself/patient well hydrated. For adults, it is recommended to drink at least 8 glasses of water daily.  This may help thin secretions and soothe the respiratory mucosa.     Sore Throat  Perform warm, salt water gargles (1/2 tsp salt to 1 cup warm water) to help reduce inflammation and throat discomfort. Chloraseptic sprays and throat lozenges will also help with your throat pain.    COUGH  A viral cough may linger for 3 to 4 weeks but should steadily improve over time. Coughing is the body's natural way to clear mucus and help get rid of bacteria and viruses. Therefore, cough suppressants are usually not recommended.      Use mucinex (guaifenisin) up to 2400mg/day to break up/loosen any mucous.     Common cough suppressants include the ingredient dextromethorphan or DM, (such as Mucinex DM) available over-the-counter and can be used for cough to stop the tickle in the back of your throat.      ? Honey may be beneficial, especially on nocturnal cough 1 to 2 teaspoons can be taken straight or diluted in tea, juice or other liquid.    The antioxidants in honey are an important contributor to its decongestant properties. Generally speaking, darker honey contains more antioxidants. Buckwheat and avocado honey are particularly good choices. If these honeys are not available in your area, choose the darkest honey you can find.    Take all medications as directed. If you have been  prescribed antibiotics, make sure to complete them.     If your condition fails to improve in a timely manner, you should receive another evaluation by your Primary Care Provider to discuss your concerns or return to urgent care for a recheck.      **You must understand that you have received Urgent Care treatment only and that you may be released before all of your medical problems are known or treated. You, the patient, are responsible to arrange for follow-up care as instructed. If your condition worsens at any time, you should report immediately to your nearest Emergency Department for further evaluation.

## 2022-06-25 NOTE — PROGRESS NOTES
"Subjective:       Patient ID: Dalia Valentine is a 51 y.o. female.    Vitals:  height is 5' 6" (1.676 m) and weight is 87.1 kg (192 lb). Her oral temperature is 99 °F (37.2 °C). Her blood pressure is 137/93 (abnormal) and her pulse is 84. Her respiration is 18 and oxygen saturation is 96%.     Chief Complaint: Cough    Patient started with symptoms on Tuesday. She became hoarse complaining of both of her ears hurting but left worse than right.     Cough  This is a new problem. The current episode started in the past 7 days. The problem has been unchanged. The problem occurs constantly. Associated symptoms include chills, ear pain, a fever, headaches, postnasal drip, rhinorrhea and a sore throat. Pertinent negatives include no chest pain, rash, shortness of breath or wheezing. Nothing aggravates the symptoms. She has tried nothing for the symptoms.       Constitution: Positive for chills and fever.   HENT: Positive for ear pain, postnasal drip, sinus pressure and sore throat. Negative for ear discharge.    Neck: Negative for neck pain and neck stiffness.   Cardiovascular: Negative for chest pain.   Respiratory: Positive for cough. Negative for shortness of breath and wheezing.    Gastrointestinal: Negative for nausea, vomiting and diarrhea.   Skin: Negative for rash.   Neurological: Positive for headaches. Negative for altered mental status.   Psychiatric/Behavioral: Negative for altered mental status and confusion.       Objective:      Physical Exam   Constitutional: She is oriented to person, place, and time. She appears well-developed. She is cooperative.  Non-toxic appearance. No distress.   HENT:   Head: Normocephalic and atraumatic.   Ears:   Right Ear: External ear and ear canal normal. A middle ear effusion is present.   Left Ear: External ear and ear canal normal. There is tenderness. Tympanic membrane is bulging. A middle ear effusion is present.   Nose: Rhinorrhea and congestion present. No " nasal deformity. No epistaxis. Right sinus exhibits no maxillary sinus tenderness and no frontal sinus tenderness. Left sinus exhibits no maxillary sinus tenderness and no frontal sinus tenderness.   Mouth/Throat: Uvula is midline and mucous membranes are normal. No trismus in the jaw. Normal dentition. No uvula swelling. Posterior oropharyngeal erythema present. No oropharyngeal exudate or posterior oropharyngeal edema.   Hoarseness noted on exam      Comments: Hoarseness noted on exam  Eyes: Conjunctivae and lids are normal. No scleral icterus.   Neck: Trachea normal and phonation normal. Neck supple. No edema present. No erythema present. No neck rigidity present.   Cardiovascular: Normal rate, regular rhythm, normal heart sounds and normal pulses.   Pulmonary/Chest: Effort normal and breath sounds normal. No respiratory distress. She has no decreased breath sounds. She has no rhonchi.   Abdominal: Normal appearance. Soft.   Musculoskeletal: Normal range of motion.         General: No deformity. Normal range of motion.   Neurological: She is alert and oriented to person, place, and time. She exhibits normal muscle tone. Coordination normal.   Skin: Skin is warm, dry, intact, not diaphoretic and not pale.   Psychiatric: Her speech is normal and behavior is normal. Judgment and thought content normal.   Nursing note and vitals reviewed.        Assessment:       1. COVID-19 virus infection    2. Acute suppurative otitis media of left ear without spontaneous rupture of tympanic membrane, recurrence not specified    3. Laryngopharyngitis acute          Plan:         COVID-19 virus infection  -     benzonatate (TESSALON) 200 MG capsule; Take 1 capsule (200 mg total) by mouth 3 (three) times daily as needed for Cough.  Dispense: 30 capsule; Refill: 0    Acute suppurative otitis media of left ear without spontaneous rupture of tympanic membrane, recurrence not specified  -     cefdinir (OMNICEF) 300 MG capsule; Take 1  capsule (300 mg total) by mouth 2 (two) times daily. for 7 days  Dispense: 14 capsule; Refill: 0    Laryngopharyngitis acute  -     POCT COVID-19 Rapid Screening

## 2022-06-29 ENCOUNTER — TELEPHONE (OUTPATIENT)
Dept: URGENT CARE | Facility: CLINIC | Age: 52
End: 2022-06-29
Payer: MEDICAID

## 2022-08-09 DIAGNOSIS — R10.10 UPPER ABDOMINAL PAIN: ICD-10-CM

## 2022-08-09 DIAGNOSIS — R11.0 NAUSEA: Primary | ICD-10-CM

## 2022-08-16 ENCOUNTER — HOSPITAL ENCOUNTER (OUTPATIENT)
Dept: RADIOLOGY | Facility: HOSPITAL | Age: 52
Discharge: HOME | End: 2022-08-16
Attending: INTERNAL MEDICINE
Payer: MEDICAID

## 2022-08-16 DIAGNOSIS — R11.0 NAUSEA: ICD-10-CM

## 2022-08-16 DIAGNOSIS — R10.10 UPPER ABDOMINAL PAIN: ICD-10-CM

## 2022-08-16 PROCEDURE — 76700 US EXAM ABDOM COMPLETE: CPT | Mod: TC

## 2022-08-29 DIAGNOSIS — R68.81 EARLY SATIETY: ICD-10-CM

## 2022-08-29 DIAGNOSIS — R10.10 UPPER ABDOMINAL PAIN: Primary | ICD-10-CM

## 2022-08-29 DIAGNOSIS — Z09 FOLLOW-UP EXAMINATION FOLLOWING SURGERY: ICD-10-CM

## 2022-08-29 DIAGNOSIS — K21.00 GASTRO-ESOPHAGEAL REFLUX DISEASE WITH ESOPHAGITIS, WITHOUT BLEEDING: ICD-10-CM

## 2022-09-01 ENCOUNTER — HOSPITAL ENCOUNTER (OUTPATIENT)
Dept: RADIOLOGY | Facility: HOSPITAL | Age: 52
Discharge: HOME | End: 2022-09-01
Attending: NURSE PRACTITIONER
Payer: MEDICAID

## 2022-09-01 DIAGNOSIS — K21.00 GASTRO-ESOPHAGEAL REFLUX DISEASE WITH ESOPHAGITIS, WITHOUT BLEEDING: ICD-10-CM

## 2022-09-01 DIAGNOSIS — Z09 FOLLOW-UP EXAMINATION FOLLOWING SURGERY: ICD-10-CM

## 2022-09-01 DIAGNOSIS — R10.10 UPPER ABDOMINAL PAIN: ICD-10-CM

## 2022-09-01 DIAGNOSIS — R68.81 EARLY SATIETY: ICD-10-CM

## 2022-09-01 PROCEDURE — A9537 TC99M MEBROFENIN: HCPCS

## 2022-09-01 PROCEDURE — 63600175 PHARM REV CODE 636 W HCPCS: Performed by: RADIOLOGY

## 2022-09-01 RX ORDER — SINCALIDE 5 UG/5ML
0.02 INJECTION, POWDER, LYOPHILIZED, FOR SOLUTION INTRAVENOUS ONCE
Status: COMPLETED | OUTPATIENT
Start: 2022-09-01 | End: 2022-09-01

## 2022-09-01 RX ADMIN — SINCALIDE 1.6 MCG: 5 INJECTION, POWDER, LYOPHILIZED, FOR SOLUTION INTRAVENOUS at 11:09

## 2022-09-06 ENCOUNTER — HOSPITAL ENCOUNTER (OUTPATIENT)
Dept: RADIOLOGY | Facility: HOSPITAL | Age: 52
Discharge: HOME | End: 2022-09-06
Attending: NURSE PRACTITIONER
Payer: MEDICAID

## 2022-09-06 DIAGNOSIS — Z09 FOLLOW-UP EXAMINATION FOLLOWING SURGERY: ICD-10-CM

## 2022-09-06 DIAGNOSIS — R68.81 EARLY SATIETY: ICD-10-CM

## 2022-09-06 DIAGNOSIS — R10.10 UPPER ABDOMINAL PAIN: ICD-10-CM

## 2022-09-06 DIAGNOSIS — K21.00 GASTRO-ESOPHAGEAL REFLUX DISEASE WITH ESOPHAGITIS, WITHOUT BLEEDING: ICD-10-CM

## 2022-09-06 PROCEDURE — A9541 TC99M SULFUR COLLOID: HCPCS

## 2022-11-16 ENCOUNTER — OFFICE VISIT (OUTPATIENT)
Dept: URGENT CARE | Facility: CLINIC | Age: 52
End: 2022-11-16
Payer: COMMERCIAL

## 2022-11-16 VITALS
RESPIRATION RATE: 18 BRPM | OXYGEN SATURATION: 100 % | TEMPERATURE: 97 F | DIASTOLIC BLOOD PRESSURE: 93 MMHG | HEIGHT: 66 IN | SYSTOLIC BLOOD PRESSURE: 145 MMHG | BODY MASS INDEX: 28.12 KG/M2 | HEART RATE: 75 BPM | WEIGHT: 175 LBS

## 2022-11-16 DIAGNOSIS — M25.512 ACUTE PAIN OF LEFT SHOULDER: Primary | ICD-10-CM

## 2022-11-16 PROCEDURE — 73030 XR SHOULDER COMPLETE 2 OR MORE VIEWS LEFT: ICD-10-PCS | Mod: LT,S$GLB,, | Performed by: RADIOLOGY

## 2022-11-16 PROCEDURE — 3077F PR MOST RECENT SYSTOLIC BLOOD PRESSURE >= 140 MM HG: ICD-10-PCS | Mod: CPTII,S$GLB,, | Performed by: NURSE PRACTITIONER

## 2022-11-16 PROCEDURE — 3008F PR BODY MASS INDEX (BMI) DOCUMENTED: ICD-10-PCS | Mod: CPTII,S$GLB,, | Performed by: NURSE PRACTITIONER

## 2022-11-16 PROCEDURE — 1160F PR REVIEW ALL MEDS BY PRESCRIBER/CLIN PHARMACIST DOCUMENTED: ICD-10-PCS | Mod: CPTII,S$GLB,, | Performed by: NURSE PRACTITIONER

## 2022-11-16 PROCEDURE — 3077F SYST BP >= 140 MM HG: CPT | Mod: CPTII,S$GLB,, | Performed by: NURSE PRACTITIONER

## 2022-11-16 PROCEDURE — 1159F PR MEDICATION LIST DOCUMENTED IN MEDICAL RECORD: ICD-10-PCS | Mod: CPTII,S$GLB,, | Performed by: NURSE PRACTITIONER

## 2022-11-16 PROCEDURE — 96372 THER/PROPH/DIAG INJ SC/IM: CPT | Mod: ,,, | Performed by: NURSE PRACTITIONER

## 2022-11-16 PROCEDURE — 1160F RVW MEDS BY RX/DR IN RCRD: CPT | Mod: CPTII,S$GLB,, | Performed by: NURSE PRACTITIONER

## 2022-11-16 PROCEDURE — 73030 X-RAY EXAM OF SHOULDER: CPT | Mod: LT,S$GLB,, | Performed by: RADIOLOGY

## 2022-11-16 PROCEDURE — 99214 OFFICE O/P EST MOD 30 MIN: CPT | Mod: 25,S$GLB,, | Performed by: NURSE PRACTITIONER

## 2022-11-16 PROCEDURE — 1159F MED LIST DOCD IN RCRD: CPT | Mod: CPTII,S$GLB,, | Performed by: NURSE PRACTITIONER

## 2022-11-16 PROCEDURE — 99214 PR OFFICE/OUTPT VISIT, EST, LEVL IV, 30-39 MIN: ICD-10-PCS | Mod: 25,S$GLB,, | Performed by: NURSE PRACTITIONER

## 2022-11-16 PROCEDURE — 3008F BODY MASS INDEX DOCD: CPT | Mod: CPTII,S$GLB,, | Performed by: NURSE PRACTITIONER

## 2022-11-16 PROCEDURE — 3080F DIAST BP >= 90 MM HG: CPT | Mod: CPTII,S$GLB,, | Performed by: NURSE PRACTITIONER

## 2022-11-16 PROCEDURE — 96372 PR INJECTION,THERAP/PROPH/DIAG2ST, IM OR SUBCUT: ICD-10-PCS | Mod: ,,, | Performed by: NURSE PRACTITIONER

## 2022-11-16 PROCEDURE — 3080F PR MOST RECENT DIASTOLIC BLOOD PRESSURE >= 90 MM HG: ICD-10-PCS | Mod: CPTII,S$GLB,, | Performed by: NURSE PRACTITIONER

## 2022-11-16 RX ORDER — DEXAMETHASONE SODIUM PHOSPHATE 100 MG/10ML
6 INJECTION INTRAMUSCULAR; INTRAVENOUS
Status: COMPLETED | OUTPATIENT
Start: 2022-11-16 | End: 2022-11-16

## 2022-11-16 RX ADMIN — DEXAMETHASONE SODIUM PHOSPHATE 6 MG: 100 INJECTION INTRAMUSCULAR; INTRAVENOUS at 07:11

## 2022-11-16 NOTE — LETTER
November 16, 2022      [facility]  [address], [address]  [place] [address]  Phone: [phone]  Fax: [phone]       Patient: Dalia Valentine   YOB: 1970  Date of Visit: 11/16/2022    To Whom It May Concern:    Jacinto Valentine  was at [facility] on 11/16/2022. The patient may return to work/school on 11/18/2022. If you have any questions or concerns, or if I can be of further assistance, please do not hesitate to contact me.    Sincerely,    Ana Rosa Altamirano NP

## 2022-11-17 NOTE — PROGRESS NOTES
"Subjective:       Patient ID: Dalia Valentine is a 52 y.o. female.    Vitals:  height is 5' 6" (1.676 m) and weight is 79.4 kg (175 lb). Her tympanic temperature is 97 °F (36.1 °C). Her blood pressure is 145/93 (abnormal) and her pulse is 75. Her respiration is 18 and oxygen saturation is 100%.     Chief Complaint: Shoulder Pain (Left shoulder)    Pt presenting with sudden onset of left shoulder pain. States pain started when she went to push open a door. Pain is described as sharp. Pain with movement.     Shoulder Pain   The pain is present in the left shoulder. This is a new problem. The current episode started today. The problem occurs constantly. The quality of the pain is described as aching and sharp. The pain is at a severity of 10/10. Associated symptoms include a limited range of motion. Pertinent negatives include no fever or headaches. The symptoms are aggravated by activity. She has tried nothing for the symptoms. The treatment provided no relief.     Constitution: Negative for fever.   Musculoskeletal:  Positive for abnormal ROM of joint.   Neurological:  Negative for headaches.     Objective:      Physical Exam   Constitutional: She is oriented to person, place, and time.  Non-toxic appearance. No distress.   HENT:   Head: Atraumatic.   Ears:   Right Ear: External ear normal.   Left Ear: External ear normal.   Mouth/Throat: Mucous membranes are moist.   Eyes: Conjunctivae are normal. No scleral icterus.   Neck: Neck supple.   Cardiovascular: Normal rate.   Pulmonary/Chest: Effort normal.   Musculoskeletal:         General: Tenderness present. No signs of injury.      Left shoulder: She exhibits decreased range of motion, tenderness and decreased strength. She exhibits no bony tenderness, no swelling, no effusion, no crepitus, no deformity and normal pulse.        Arms:       Comments: Pain reproducible, no chest pain, shortness a breath   Neurological: She is alert and oriented to person, " place, and time.   Skin: Skin is warm, dry and not diaphoretic.   Psychiatric: Her behavior is normal. Mood, judgment and thought content normal.       Assessment:       1. Acute pain of left shoulder          Plan:       Type of Interpretation: ED Physician (Independently Interpreted).  Radiology Procedure Done: Left Shoulder.  Interpretation: No acute fracture or dislocation identified         Acute pain of left shoulder  -     X-Ray Shoulder 2 or More Views Left; Future; Expected date: 11/16/2022  -     dexAMETHasone injection 6 mg  -     Ambulatory referral/consult to Orthopedics         Medical Decision Making:   History:   Old Medical Records: I decided to obtain old medical records.  Old Records Summarized: records from clinic visits and other records.  Independently Interpreted Test(s):   I have ordered and independently interpreted X-rays - see prior notes.  Clinical Tests:   Radiological Study: Ordered and Reviewed

## 2022-11-17 NOTE — PATIENT INSTRUCTIONS
1.  Take all medications as directed. If you have been prescribed antibiotics, make sure to complete them.   2.  Rest and keep yourself/patient well hydrated. For adults, it is recommended to drink at least 8-10 glasses of water daily.   3.  For patients above 6 months of age who are not allergic to and are not on anticoagulants, you can alternate Tylenol and Motrin every 4-6 hours for fever above 100.4F and/or pain.  For patients less than 6 months of age, allergic to or intolerant to NSAIDS, have gastritis, gastric ulcers, or history of GI bleeds, are pregnant, or are on anticoagulant therapy, you can take Tylenol every 4 hours as needed for fever above 100.4F and/or pain.   4. You should schedule a follow-up appointment with your Primary Care Provider/Pediatrician for recheck in 2-3 days or as directed at this visit.   5.  If your condition fails to improve in a timely manner, you should receive another evaluation by your Primary Care Provider/Pediatrician to discuss your concerns or return to urgent care for a recheck.  If your condition worsens at any time, you should report immediately to your nearest Emergency Department for further evaluation. **You must understand that you have received Urgent Care treatment only and that you may be released before all of your medical problems are known or treated. You, the patient, are responsible to arrange for follow-up care as instructed.

## 2022-12-02 DIAGNOSIS — M54.30 SCIATICA: Primary | ICD-10-CM

## 2023-01-19 ENCOUNTER — HOSPITAL ENCOUNTER (EMERGENCY)
Facility: HOSPITAL | Age: 53
Discharge: HOME | End: 2023-01-19
Attending: EMERGENCY MEDICINE
Payer: COMMERCIAL

## 2023-01-19 VITALS
TEMPERATURE: 99 F | RESPIRATION RATE: 18 BRPM | DIASTOLIC BLOOD PRESSURE: 70 MMHG | OXYGEN SATURATION: 98 % | HEART RATE: 73 BPM | SYSTOLIC BLOOD PRESSURE: 145 MMHG

## 2023-01-19 DIAGNOSIS — H92.01 OTALGIA OF RIGHT EAR: Primary | ICD-10-CM

## 2023-01-19 DIAGNOSIS — J04.0 LARYNGITIS: ICD-10-CM

## 2023-01-19 PROCEDURE — 99284 EMERGENCY DEPT VISIT MOD MDM: CPT

## 2023-01-19 RX ORDER — PREDNISONE 20 MG/1
20 TABLET ORAL DAILY
Qty: 5 TABLET | Refills: 0 | Status: SHIPPED | OUTPATIENT
Start: 2023-01-19 | End: 2023-01-24

## 2023-01-19 RX ORDER — AMOXICILLIN AND CLAVULANATE POTASSIUM 875; 125 MG/1; MG/1
1 TABLET, FILM COATED ORAL 2 TIMES DAILY
Qty: 14 TABLET | Refills: 0 | Status: SHIPPED | OUTPATIENT
Start: 2023-01-19 | End: 2023-02-02

## 2023-01-19 NOTE — Clinical Note
"Dalia "Mima Valentine was seen and treated in our emergency department on 1/19/2023.  She may return to work on 01/23/2023.       If you have any questions or concerns, please don't hesitate to call.      Cb Powell MD"

## 2023-01-20 NOTE — ED PROVIDER NOTES
Encounter Date: 1/19/2023       History     Chief Complaint   Patient presents with    Otalgia     Ear pain and loss of voice without throat painx3 days     52-year-old female presents to emergency room with right ear pain and loss of voice without throat pain for last 3 days.    Review of patient's allergies indicates:   Allergen Reactions    Ibuprofen Other (See Comments)     Causes bleeding in stomach     Past Medical History:   Diagnosis Date    Acid reflux     Allergy     Anemia     Angio-edema     Arthritis     Asthma     Depression     Fever blister     Fibromyalgia     Fibromyalgia     Glaucoma     Hypercholesteremia     Hypertension     Irregular heart beat     Joint pain     Migraines     Mixed connective tissue disease     Neuromuscular disorder     Osteoarthritis     Osteoporosis     Restless leg syndrome     Urticaria     Vitamin D deficiency      Past Surgical History:   Procedure Laterality Date    BLADDER AUGMENTATION      COLPOSCOPY      HYSTERECTOMY      TONSILLECTOMY      TUBAL LIGATION       Family History   Problem Relation Age of Onset    Arthritis Mother     Heart failure Mother     Hypertension Mother     Colon polyps Mother     Allergic rhinitis Mother     Allergies Mother     Heart attack Father     Hypertension Father     Hyperlipidemia Father     Diabetes Mellitus Father     Anemia Sister     Rheum arthritis Brother      Social History     Tobacco Use    Smoking status: Never    Smokeless tobacco: Never   Substance Use Topics    Alcohol use: No     Alcohol/week: 0.0 standard drinks    Drug use: No     Review of Systems   Constitutional:  Negative for fever.   HENT:  Positive for ear pain. Negative for sore throat.    Respiratory:  Negative for shortness of breath.    Cardiovascular:  Negative for chest pain.   Gastrointestinal:  Negative for nausea.   Genitourinary:  Negative for dysuria.   Musculoskeletal:  Negative for back pain.   Skin:  Negative for rash.   Neurological:  Negative for  weakness.   Hematological:  Does not bruise/bleed easily.   All other systems reviewed and are negative.    Physical Exam     Initial Vitals [01/19/23 1904]   BP Pulse Resp Temp SpO2   (!) 145/70 73 18 98.6 °F (37 °C) 98 %      MAP       --         Physical Exam    Nursing note and vitals reviewed.  Constitutional: She appears well-developed and well-nourished.   HENT:   Head: Normocephalic and atraumatic.   Right Ear: Tympanic membrane is erythematous and bulging.   Mouth/Throat: Posterior oropharyngeal erythema present.   Eyes: Pupils are equal, round, and reactive to light.   Neck:   Normal range of motion.  Cardiovascular:  Normal rate and regular rhythm.           Pulmonary/Chest: Breath sounds normal.   Abdominal: Abdomen is soft. Bowel sounds are normal.   Musculoskeletal:         General: Normal range of motion.      Cervical back: Normal range of motion.     Neurological: She is alert and oriented to person, place, and time.   Skin: Skin is warm and dry.   Psychiatric: She has a normal mood and affect.       ED Course   Procedures  Labs Reviewed - No data to display       Imaging Results    None          Medications - No data to display  Medical Decision Making:   Differential Diagnosis:   Otitis media, laryngitis, pharyngitis                        Clinical Impression:   Final diagnoses:  [H92.01] Otalgia of right ear (Primary)  [J04.0] Laryngitis        ED Disposition Condition    Discharge Stable          ED Prescriptions       Medication Sig Dispense Start Date End Date Auth. Provider    amoxicillin-clavulanate 875-125mg (AUGMENTIN) 875-125 mg per tablet Take 1 tablet by mouth 2 (two) times daily. 14 tablet 1/19/2023 -- Allison Post NP    predniSONE (DELTASONE) 20 MG tablet Take 1 tablet (20 mg total) by mouth once daily. for 5 days 5 tablet 1/19/2023 1/24/2023 Allison Post NP          Follow-up Information       Follow up With Specialties Details Why Contact Info    Aram Melgar  MD [facility]  As needed [address] [facility] [address]  [phone]               Allison Post NP  01/19/23 1910     Yes, record another set of vital signs

## 2023-02-27 ENCOUNTER — TELEPHONE (OUTPATIENT)
Dept: ORTHOPEDICS | Facility: CLINIC | Age: 53
End: 2023-02-27
Payer: COMMERCIAL

## 2023-02-27 DIAGNOSIS — M25.512 LEFT SHOULDER PAIN, UNSPECIFIED CHRONICITY: Primary | ICD-10-CM

## 2023-03-01 ENCOUNTER — OFFICE VISIT (OUTPATIENT)
Dept: ORTHOPEDICS | Facility: CLINIC | Age: 53
End: 2023-03-01
Payer: COMMERCIAL

## 2023-03-01 ENCOUNTER — HOSPITAL ENCOUNTER (OUTPATIENT)
Dept: RADIOLOGY | Facility: HOSPITAL | Age: 53
Discharge: HOME | End: 2023-03-01
Attending: PHYSICIAN ASSISTANT
Payer: COMMERCIAL

## 2023-03-01 VITALS
WEIGHT: 181.69 LBS | BODY MASS INDEX: 29.2 KG/M2 | SYSTOLIC BLOOD PRESSURE: 140 MMHG | HEART RATE: 79 BPM | HEIGHT: 66 IN | DIASTOLIC BLOOD PRESSURE: 90 MMHG | OXYGEN SATURATION: 100 %

## 2023-03-01 DIAGNOSIS — M25.512 LEFT SHOULDER PAIN, UNSPECIFIED CHRONICITY: ICD-10-CM

## 2023-03-01 DIAGNOSIS — M25.512 CHRONIC LEFT SHOULDER PAIN: Primary | ICD-10-CM

## 2023-03-01 DIAGNOSIS — G89.29 CHRONIC LEFT SHOULDER PAIN: Primary | ICD-10-CM

## 2023-03-01 PROCEDURE — 3077F SYST BP >= 140 MM HG: CPT | Mod: CPTII,S$GLB,, | Performed by: PHYSICIAN ASSISTANT

## 2023-03-01 PROCEDURE — 73030 X-RAY EXAM OF SHOULDER: CPT | Mod: TC,LT

## 2023-03-01 PROCEDURE — 73030 X-RAY EXAM OF SHOULDER: CPT | Mod: 26,LT,, | Performed by: RADIOLOGY

## 2023-03-01 PROCEDURE — 1159F MED LIST DOCD IN RCRD: CPT | Mod: CPTII,S$GLB,, | Performed by: PHYSICIAN ASSISTANT

## 2023-03-01 PROCEDURE — 99203 PR OFFICE/OUTPT VISIT, NEW, LEVL III, 30-44 MIN: ICD-10-PCS | Mod: 25,S$GLB,, | Performed by: PHYSICIAN ASSISTANT

## 2023-03-01 PROCEDURE — 99999 PR PBB SHADOW E&M-EST. PATIENT-LVL V: CPT | Mod: PBBFAC,,, | Performed by: PHYSICIAN ASSISTANT

## 2023-03-01 PROCEDURE — 3080F PR MOST RECENT DIASTOLIC BLOOD PRESSURE >= 90 MM HG: ICD-10-PCS | Mod: CPTII,S$GLB,, | Performed by: PHYSICIAN ASSISTANT

## 2023-03-01 PROCEDURE — 73030 XR SHOULDER COMPLETE 2 OR MORE VIEWS LEFT: ICD-10-PCS | Mod: 26,LT,, | Performed by: RADIOLOGY

## 2023-03-01 PROCEDURE — 97110 THERAPEUTIC EXERCISES: CPT | Mod: GP,,, | Performed by: PHYSICIAN ASSISTANT

## 2023-03-01 PROCEDURE — 20610 PR DRAIN/INJECT LARGE JOINT/BURSA: ICD-10-PCS | Mod: LT,S$GLB,, | Performed by: PHYSICIAN ASSISTANT

## 2023-03-01 PROCEDURE — 3080F DIAST BP >= 90 MM HG: CPT | Mod: CPTII,S$GLB,, | Performed by: PHYSICIAN ASSISTANT

## 2023-03-01 PROCEDURE — 1160F RVW MEDS BY RX/DR IN RCRD: CPT | Mod: CPTII,S$GLB,, | Performed by: PHYSICIAN ASSISTANT

## 2023-03-01 PROCEDURE — 3077F PR MOST RECENT SYSTOLIC BLOOD PRESSURE >= 140 MM HG: ICD-10-PCS | Mod: CPTII,S$GLB,, | Performed by: PHYSICIAN ASSISTANT

## 2023-03-01 PROCEDURE — 97110 PR THERAPEUTIC EXERCISES: ICD-10-PCS | Mod: GP,,, | Performed by: PHYSICIAN ASSISTANT

## 2023-03-01 PROCEDURE — 3008F BODY MASS INDEX DOCD: CPT | Mod: CPTII,S$GLB,, | Performed by: PHYSICIAN ASSISTANT

## 2023-03-01 PROCEDURE — 20610 DRAIN/INJ JOINT/BURSA W/O US: CPT | Mod: LT,S$GLB,, | Performed by: PHYSICIAN ASSISTANT

## 2023-03-01 PROCEDURE — 20610 DRAIN/INJ JOINT/BURSA W/O US: CPT | Mod: PBBFAC,LT | Performed by: PHYSICIAN ASSISTANT

## 2023-03-01 PROCEDURE — 1159F PR MEDICATION LIST DOCUMENTED IN MEDICAL RECORD: ICD-10-PCS | Mod: CPTII,S$GLB,, | Performed by: PHYSICIAN ASSISTANT

## 2023-03-01 PROCEDURE — 99215 OFFICE O/P EST HI 40 MIN: CPT | Mod: PBBFAC,25 | Performed by: PHYSICIAN ASSISTANT

## 2023-03-01 PROCEDURE — 99999 PR PBB SHADOW E&M-EST. PATIENT-LVL V: ICD-10-PCS | Mod: PBBFAC,,, | Performed by: PHYSICIAN ASSISTANT

## 2023-03-01 PROCEDURE — 3008F PR BODY MASS INDEX (BMI) DOCUMENTED: ICD-10-PCS | Mod: CPTII,S$GLB,, | Performed by: PHYSICIAN ASSISTANT

## 2023-03-01 PROCEDURE — 1160F PR REVIEW ALL MEDS BY PRESCRIBER/CLIN PHARMACIST DOCUMENTED: ICD-10-PCS | Mod: CPTII,S$GLB,, | Performed by: PHYSICIAN ASSISTANT

## 2023-03-01 PROCEDURE — 99203 OFFICE O/P NEW LOW 30 MIN: CPT | Mod: 25,S$GLB,, | Performed by: PHYSICIAN ASSISTANT

## 2023-03-01 RX ORDER — TRIAMCINOLONE ACETONIDE 40 MG/ML
40 INJECTION, SUSPENSION INTRA-ARTICULAR; INTRAMUSCULAR ONCE
Status: COMPLETED | OUTPATIENT
Start: 2023-03-01 | End: 2023-03-01

## 2023-03-01 RX ADMIN — TRIAMCINOLONE ACETONIDE 40 MG: 40 INJECTION, SUSPENSION INTRA-ARTICULAR; INTRAMUSCULAR at 03:03

## 2023-03-01 NOTE — PROGRESS NOTES
"Subjective:      Patient ID: Dalia Valentine is a 52 y.o. female.    Chief Complaint: Numbness, Swelling, and Pain of the Left Shoulder (- "when pain first came I wasn't able to lift my arm up, but I can now but im still having pain"/- "popping noise"/- Swelling in front of shoulder)    Review of patient's allergies indicates:   Allergen Reactions    Ibuprofen Other (See Comments)     Causes bleeding in stomach      51 yo LHD F presents to clinic with c/o left shoulder pain x 3 months.  No specific injury/trauma but states pain and ROM worse after reaching out to open a door one day.  Sharp pain to anterior shoulder.  Worse with reaching overhead and especially with reaching behind back.  Taking diclofenac which helps some.  She reports that ROM has been gradually improving over past few months, however, shoulder is still painful.  No neck pain or injury.      Review of Systems   Constitutional: Negative for chills, diaphoresis and fever.   HENT:  Negative for congestion, ear discharge and ear pain.    Eyes:  Negative for blurred vision, discharge, double vision and pain.   Cardiovascular:  Negative for chest pain, claudication and cyanosis.   Respiratory:  Negative for cough, hemoptysis and shortness of breath.    Endocrine: Negative for cold intolerance and heat intolerance.   Skin:  Negative for color change, dry skin, itching and rash.   Musculoskeletal:  Positive for joint pain and muscle weakness. Negative for arthritis, back pain, falls, gout, joint swelling and neck pain.   Gastrointestinal:  Negative for abdominal pain and change in bowel habit.   Neurological:  Negative for brief paralysis, disturbances in coordination, dizziness, numbness and paresthesias.   Psychiatric/Behavioral:  Negative for altered mental status and depression.        Objective:          General    Constitutional: She is oriented to person, place, and time. She appears well-developed and well-nourished. No distress. "   HENT:   Head: Atraumatic.   Eyes: EOM are normal. Right eye exhibits no discharge. Left eye exhibits no discharge.   Cardiovascular:  Normal rate.            Pulmonary/Chest: Effort normal. No respiratory distress.   Abdominal: Soft.   Neurological: She is alert and oriented to person, place, and time.   Psychiatric: She has a normal mood and affect. Her behavior is normal.         Right Shoulder Exam     Inspection/Observation   Swelling: absent  Bruising: absent  Scars: absent  Deformity: absent    Range of Motion   Active abduction:  normal   Passive abduction:  normal   Extension:  normal   Forward Flexion:  normal   Forward Elevation: normal  Internal rotation 0 degrees:  normal     Tests & Signs   Salinas test: negative  Impingement: negative  Lift Off Sign: negative  Belly Press: negative    Other   Sensation: normal    Left Shoulder Exam     Inspection/Observation   Swelling: absent  Bruising: absent  Scars: absent  Deformity: absent    Range of Motion   Active abduction:  abnormal   Passive abduction:  abnormal   Extension:  abnormal   Forward Flexion:  abnormal   Forward Elevation: abnormal  Internal rotation 0 degrees:  abnormal     Tests & Signs   Salinas test: positive  Impingement: positive  Lift Off Sign: positive  Belly Press: positive    Other   Sensation: normal     Comments:  Tenderness to anterior shoulder      Vascular Exam     Right Pulses      Radial:                    2+      Left Pulses      Radial:                    2+      Capillary Refill  Right Hand: normal capillary refill  Left Hand: normal capillary refill                Assessment:         Xray Left Shoulder 3/1/23:  1. No abnormality identified.      Encounter Diagnosis   Name Primary?    Chronic left shoulder pain Yes    Chronic left shoulder pain  -     triamcinolone acetonide injection 40 mg               Plan:         The total face-to-face encounter time with this patient was 30 minutes and greater than 50% of of the  encounter time was spent counseling the patient, coordinating care, and education regarding the diagnosis. We have discussed a variety of treatment options including medications, injections, physical therapy and other alternative treatments. I also explained the indications, risks and benefits of surgery. Patient chooses to proceed with CSI and physical therapy at this time.  Today, the patient chooses  a CSI and understands a minimum of 3 months time must lapse after injection, prior to a surgical procedure due to increased risk of infection.     I made the decision to obtain old records of the patient including previous notes and imaging. New imaging was ordered today of the extremity or extremities evaluated. I independently reviewed and interpreted the radiographs and/or MRIs today as well as prior imaging.      1. Injection Procedure  A time out was performed, including verification of patient ID, procedure, site and side, availability of information and equipment, review of safety issues, and agreement with consent, the procedure site was marked.    After time out was performed, the patient was prepped aseptically with chloraprep swabstick. A diagnostic and therapeutic injection of 1:3cc Kenalog/Marcaine was given under sterile technique using a 22g x 1.5 needle from the Posterior  aspect of the left Subacromial in the sitting position.      Dalia Valentine had no adverse reactions to the medication. Pain decreased. She was instructed to apply ice to the joint for 20 minutes and avoid strenuous activities for 24-36 hours following the injection. She was warned of possible blood sugar and/or blood pressure changes during that time. Following that time, she can resume regular activities.    She was reminded to call the clinic immediately for any adverse side effects as explained in clinic today.    2. Ice compress to the affected area 2-3x a day for 15-20 minutes as needed for pain management.  3.  Continue diclofenac as prescribed as needed.  4. Ambulatory referral to physical therapy for periscapular/rotator cuff strengthening.   5. HEP 97262 - I instructed and demonstrated a shoulder/rotator cuff conditioning HEP. The patient then demonstrated understanding of exercises and proper technique. This program was performed for 10 minutes.    6. RTC in 6 weeks for follow-up, sooner if needed. Consider MRI if not improved.    Patient voices understanding of and agreement with treatment plan. All of the patient's questions were answered and the patient will contact us if she has any questions or concerns in the interim.

## 2023-03-08 ENCOUNTER — CLINICAL SUPPORT (OUTPATIENT)
Dept: REHABILITATION | Facility: HOSPITAL | Age: 53
End: 2023-03-08
Payer: COMMERCIAL

## 2023-03-08 DIAGNOSIS — M25.512 CHRONIC LEFT SHOULDER PAIN: ICD-10-CM

## 2023-03-08 DIAGNOSIS — G89.29 CHRONIC LEFT SHOULDER PAIN: ICD-10-CM

## 2023-03-08 PROCEDURE — 97166 OT EVAL MOD COMPLEX 45 MIN: CPT

## 2023-03-08 NOTE — PLAN OF CARE
Patient: Dalia Bearden [facility] #: [id number]  Date of evaluation: 03/08/2023     Referring Physician: Mary Siddiqi PA-C  Diagnosis:   Encounter Diagnosis   Name Primary?    Chronic left shoulder pain      Referral Orders: Eval and Treat  Age: 52 y.o.  Sex: female          Hand dominance: Ambidextrous    Time In: 3:00  Time Out: 3:50    Occupation: Teacher  Working presently: Yes    Date of onset: ~ 6 months ago pt states that she was opening a door and felt a sharp pain in her shoulder. Pt states that she had frequent falls in the past due to other medical problems. Pt R shoulder is giving her pain as well.  Involved area: left  Mechanism of Injury: unknown    Past Medical History:   Diagnosis Date    Acid reflux     Allergy     Anemia     Angio-edema     Arthritis     Asthma     Depression     Fever blister     Fibromyalgia     Fibromyalgia     Glaucoma     Hypercholesteremia     Hypertension     Irregular heart beat     Joint pain     Migraines     Mixed connective tissue disease     Neuromuscular disorder     Osteoarthritis     Osteoporosis     Restless leg syndrome     Urticaria     Vitamin D deficiency      Precautions:   Current Outpatient Medications   Medication Sig    albuterol (PROVENTIL/VENTOLIN HFA) 90 mcg/actuation inhaler ProAir HFA 90 mcg/actuation aerosol inhaler    ascorbic acid, vitamin C, (VITAMIN C) 500 MG tablet Take 500 mg by mouth 3 (three) times daily before meals.    atorvastatin (LIPITOR) 40 MG tablet     budesonide 180mcg (PULMICORT 180MCG) 180 mcg/actuation AePB Pulmicort Flexhaler 180 mcg/actuation breath activated   INHALE 1 PUFF BY MOUTH TWICE DAILY    cetirizine (ZYRTEC) 10 MG tablet     diclofenac (CATAFLAM) 50 MG tablet Take 50 mg by mouth 2 (two) times daily as needed.    diclofenac (VOLTAREN) 50 MG EC tablet 1 tablet with food or milk    diclofenac sodium (VOLTAREN) 1 % Gel diclofenac 1 % topical gel    DULoxetine (CYMBALTA) 20 MG capsule Take 1 capsule (20 mg total)  by mouth once daily.    EPINEPHrine (EPIPEN) 0.3 mg/0.3 mL AtIn epinephrine 0.3 mg/0.3 mL injection, auto-injector   USE AS DIRECTED AS NEEDED    famotidine (PEPCID) 40 MG tablet Take 40 mg by mouth every evening.    fluticasone propionate (FLONASE) 50 mcg/actuation nasal spray 1 spray by Each Nostril route.    linaCLOtide (LINZESS) 72 mcg Cap capsule 1 capsule on an empty stomach    losartan-hydrochlorothiazide 50-12.5 mg (HYZAAR) 50-12.5 mg per tablet 1 tablet    memantine (NAMENDA) 5 MG Tab memantine 5 mg tablet   TAKE 1 TABLET BY MOUTH ONCE DAILY AT BEDTIME    montelukast (SINGULAIR) 10 mg tablet Take 10 mg by mouth every evening.     pantoprazole (PROTONIX) 40 MG tablet Take 40 mg by mouth once daily.    pregabalin (LYRICA) 50 MG capsule Take 50 mg by mouth 3 (three) times daily.    pyridoxine, vitamin B6, (B-6) 100 MG Tab Take 100 mg by mouth every other day.     selenium 200 mcg Tab Take 1 tablet by mouth every other day.     sumatriptan (IMITREX) 25 MG Tab 25 mg once.     triamcinolone acetonide 0.1% (KENALOG) 0.1 % cream Apply topically 2 (two) times daily.    vitamin D 1000 units Tab Take 185 mg by mouth once daily.     No current facility-administered medications for this visit.     Review of patient's allergies indicates:   Allergen Reactions    Ibuprofen Other (See Comments)     Causes bleeding in stomach       Subjective:    Pain:  During no work: 4/10  While workin/10  Location of pain: L shoulder    Dalia's goals for therapy are: Decrease pain in L shoulder, increase ROM, increase functional use of L shoulder, increase independence in ADLs. Pt states that she has multiple falls in the past that might have contributed to her injury.       Objective:  Sensation Test: experiences tingling experiences numbness in L shoulder that radiates down arm at times during the day.    Observation/Inspection   Left Right   Anatomic Symmetry normal normal   Bony normal normal   Suparspinatus normal normal    Infraspinatus normal normal   Rhomboid normal normal       Range of Motion:   Right: Not tested  Left: limited as follows: (see measurements table below)     (L) UE     PROM Norm   Shoulder Flexion  0-180   Shoulder Flexion 90 180   Shoulder Abduction 90 0-180   Shoulder Extension 20 0-50     ROM Comments:   Hard end feel and Pain at end range      Strength  Shoulder Flexion LUE: 3-/5   Shoulder Extension LUE: 3-/5   Shoulder Abduction LUE: 3-/5   Shoulder Abbduction LUE: 3-/5   Internal Rotation LUE:  3-/5   External Rotation LUE:  3-/5   Horizontal Adduction LUE:  3-/5     Quick Dash: 56.8%    Special Tests:  Positive: lift off sign    Limitations of Functional Status:   Self Care: requires increase time to don clothes, inability or pain with donning bra, and reaching overhead, washing hair  Leisure: lifting, reaching, crafting    Treatment included: OT evaluation, HEP will be given to pt at first visit.    Assessment  This 52 y.o. female referred to Outpatient Occupational Therapy with diagnosis of   Encounter Diagnosis   Name Primary?    Chronic left shoulder pain     presents with limitations as described in problem list. Patient can benefit from Occupational Therapy services for Ultrasound, moist heat, PROM, AAROM, AROM, Theraputic exercises, joint mobs, home exercise program provied with written instructions, ice, strengthening, Theraband Ex, UBE, and pulley ex in order to maximize painfree functional use of  left UE. . The following goals were discussed with the patient and she is in agreement with them as to be addressed in the treatment plan.     Problem List:   Decreased function of Left UE, Decreased ROM, Increased pain, Decreased strength, Inability to perform work/tasks, Difficulty sleeping, Inability to perform leisure activiites, and Inability to perform self care tasks    Goals to be met in 6 weeks:  1) Pt will be independent and report performing HEP to maximize (L) shoulder functional  capacity.  2) Pt will increase L shoulder AROM in all measured planes of motion by 5-10 degrees with daily task.  3) Pt will increase ability to move/handle objects AEB decreased score on QuickDASH <=40.9% (currently 56.8%)  4) Pt will report one degree less of pain with use.  5) Pt will report use of home modalities for pain management.    Plan  Dalia to be treated by Occupational Therapy 3 times per week for 6 weeks to achieve the established goals.   Treatment to include Ultrasoud @ 3mHz, AAROM Mobilization of GH joint, PROM Home program, Ice, Moist heat, Strengthening Theraband Ex, UBE , and E- stim as well as any other treatments deemed necessary based on the patient's needs or progress.     Certification Dates: 3/8/2023 - 4/212023    I certify the need for these services furnished under this plan of treatment and while under my care    ____________________________________  Physician/Referring Practitioner    _______________  Date of Signature

## 2023-03-13 ENCOUNTER — CLINICAL SUPPORT (OUTPATIENT)
Dept: REHABILITATION | Facility: HOSPITAL | Age: 53
End: 2023-03-13
Payer: COMMERCIAL

## 2023-03-13 DIAGNOSIS — G89.29 CHRONIC LEFT SHOULDER PAIN: Primary | ICD-10-CM

## 2023-03-13 DIAGNOSIS — M25.512 CHRONIC LEFT SHOULDER PAIN: Primary | ICD-10-CM

## 2023-03-13 PROCEDURE — 97530 THERAPEUTIC ACTIVITIES: CPT | Mod: CO

## 2023-03-15 ENCOUNTER — CLINICAL SUPPORT (OUTPATIENT)
Dept: REHABILITATION | Facility: HOSPITAL | Age: 53
End: 2023-03-15
Payer: COMMERCIAL

## 2023-03-15 DIAGNOSIS — G89.29 CHRONIC LEFT SHOULDER PAIN: Primary | ICD-10-CM

## 2023-03-15 DIAGNOSIS — M25.512 CHRONIC LEFT SHOULDER PAIN: Primary | ICD-10-CM

## 2023-03-15 PROCEDURE — 97530 THERAPEUTIC ACTIVITIES: CPT

## 2023-03-15 NOTE — PROGRESS NOTES
"Patient:  Dalia Bearden [facility] #:  [id number]   Date of Note: 03/15/2023   Referring Physician:  Mary Siddiqi PA-C  Diagnosis:  No diagnosis found.   Encounter Diagnosis   Name Primary?    Chronic left shoulder pain        Start Time: 2:00 pm  End Time: 2:59 pm  Total Time: 59 min      Subjective:   "It's not to bad today."  Pain: 2 out of 10     Objective:   Patient seen by OT this session. Treatment  consist of the following:  manual therapy/joint mobilization, Therapeutic exercises, Therapeutic activities, and Neuromuscular re-education    Treatment: Therapeutic activities x 59 min,    -5 min UBE   -over head pulley in shoulder flexion, and abduction, and IR/ER for 2 min each direction  -finger latter x 5 trials in left shoulder flexion and abduction  -table slides x 10 in left shoulder flexion and scaption.   -towel slides 2 x 10 in left shoulder flexion, abduction,horizontal abduction/adduction, and rotations  -shoulder arc x 3 min with left shoulder   -pendulums for 1 min each direction in left shoulder vertical, horizontal, and rotations    Assessment:  Patient able to complete active assist range of motion task this day with slight increase in pain. Patient reported most painful/difficult was finger latter in abduction and shoulder arc. Pt reports that she is compliant with home exercise program. Pt will continue to benefit from skilled OT intervention. Patient continues to demonstrate limitation  with  ROM, Joint mobility, Stiffness, Decreased functional use, Decreased strength, Continued pain, and Continued inflammation       Goals to be met in 6 weeks:  1) Pt will be independent and report performing HEP to maximize (L) shoulder functional capacity.  2) Pt will increase L shoulder AROM in all measured planes of motion by 5-10 degrees with daily task.  3) Pt will increase ability to move/handle objects AEB decreased score on QuickDASH <=40.9% (currently 56.8%)  4) Pt will report one degree " less of pain with use.  5) Pt will report use of home modalities for pain management.     Plan  Dalia to be treated by Occupational Therapy 3 times per week for 6 weeks to achieve the established goals.   Treatment to include Ultrasoud @ 3mHz, AAROM Mobilization of GH joint, PROM Home program, Ice, Moist heat, Strengthening Theraband Ex, UBE , and E- stim as well as any other treatments deemed necessary based on the patient's needs or progress.      Certification Dates: 3/8/2023 - 4/212023

## 2023-03-20 ENCOUNTER — TELEPHONE (OUTPATIENT)
Dept: REHABILITATION | Facility: HOSPITAL | Age: 53
End: 2023-03-20
Payer: COMMERCIAL

## 2023-03-21 ENCOUNTER — CLINICAL SUPPORT (OUTPATIENT)
Dept: REHABILITATION | Facility: HOSPITAL | Age: 53
End: 2023-03-21
Payer: COMMERCIAL

## 2023-03-21 DIAGNOSIS — M25.512 CHRONIC LEFT SHOULDER PAIN: Primary | ICD-10-CM

## 2023-03-21 DIAGNOSIS — G89.29 CHRONIC LEFT SHOULDER PAIN: Primary | ICD-10-CM

## 2023-03-21 PROCEDURE — 97530 THERAPEUTIC ACTIVITIES: CPT | Mod: CO

## 2023-03-21 NOTE — PROGRESS NOTES
"Patient:  Dalia Bearden [facility] #:  [id number]   Date of Note: 03/21/2023   Referring Physician:  Mary Siddiqi PA-C  Diagnosis:    Encounter Diagnosis   Name Primary?    Chronic left shoulder pain Yes      Start Time: 2:00 pm  End Time: 2:53 pm  Total Time: 59 min      Subjective:   "It's slowly getting better."  Pain: 2 out of 10     Objective:   Patient seen by ALBERT this session. Treatment  consist of the following:  manual therapy/joint mobilization, Therapeutic exercises, Therapeutic activities, and Neuromuscular re-education    Treatment: Therapeutic activities x 59 min,    -5 min UBE (2.5 forward and 2.5 min backwards)  -over head pulley in shoulder flexion, and abduction, and IR/ER for 2 min each direction  -finger latter x 5 trials in left shoulder flexion and abduction  -over head pulley in shoulder flexion and abduction for 2 min each direction  -towel slides 2 x 10 in left shoulder flexion, abduction,horizontal abduction/adduction, and rotations  -shoulder arc x 3 min with left shoulder   -pendulums for 1 min each direction in left shoulder vertical, horizontal, and rotations    Assessment:  Patient able to complete active assist range of motion task this day with slight increase in pain. Patient reported most painful/difficult was activity/exercise in all task in horizontal plane. Patient reported its more of an ache/muscle fatigue and not sharp pain.While completing finger latter ALBERT measured shoulder flexion at 105 and shoulder abduction at 102. Pt reports that she is compliant with home exercise program. Pt will continue to benefit from skilled OT intervention. Patient continues to demonstrate limitation  with  ROM, Joint mobility, Stiffness, Decreased functional use, Decreased strength, Continued pain, and Continued inflammation     Goals to be met in 6 weeks:  1) Pt will be independent and report performing HEP to maximize (L) shoulder functional capacity.  2) Pt will increase L " shoulder AROM in all measured planes of motion by 5-10 degrees with daily task.  3) Pt will increase ability to move/handle objects AEB decreased score on QuickDASH <=40.9% (currently 56.8%)  4) Pt will report one degree less of pain with use.  5) Pt will report use of home modalities for pain management.     Plan  Dalia to be treated by Occupational Therapy 3 times per week for 6 weeks to achieve the established goals.   Treatment to include Ultrasoud @ 3mHz, AAROM Mobilization of GH joint, PROM Home program, Ice, Moist heat, Strengthening Theraband Ex, UBE , and E- stim as well as any other treatments deemed necessary based on the patient's needs or progress.      Certification Dates: 3/8/2023 - 4/212023

## 2023-03-22 ENCOUNTER — CLINICAL SUPPORT (OUTPATIENT)
Dept: REHABILITATION | Facility: HOSPITAL | Age: 53
End: 2023-03-22
Payer: COMMERCIAL

## 2023-03-22 DIAGNOSIS — G89.29 CHRONIC LEFT SHOULDER PAIN: Primary | ICD-10-CM

## 2023-03-22 DIAGNOSIS — M25.512 CHRONIC LEFT SHOULDER PAIN: Primary | ICD-10-CM

## 2023-03-22 PROCEDURE — 97530 THERAPEUTIC ACTIVITIES: CPT

## 2023-03-22 NOTE — PROGRESS NOTES
"Patient:  Dalia Bearden [facility] #:  [id number]   Date of Note: 03/22/2023   Referring Physician:  Mary Siddiqi PA-C  Diagnosis:    Encounter Diagnosis   Name Primary?    Chronic left shoulder pain Yes      Start Time: 2:00 pm  End Time: 2:53 pm  Total Time: 53 min      Subjective:   "It's not bad today."  Pain: 2 out of 10     Objective:   Patient seen by OT this session. Treatment  consist of the following:  manual therapy/joint mobilization, Therapeutic exercises, Therapeutic activities, and Neuromuscular re-education    Treatment:    -5 min UBE (2.5 forward and 2.5 min backwards)  -over head pulley in shoulder flexion, and abduction, and IR/ER for 2 min each direction  -finger latter x 5 trials in left shoulder flexion and abduction  -over head pulley in shoulder flexion and abduction for 2 min each direction  -towel slides 2 x 10 in left shoulder flexion, abduction,horizontal abduction/adduction, and rotations  -shoulder arc x 3 min with left shoulder   -pendulums for 1 min each direction in left shoulder vertical, horizontal, and rotations    Assessment:  Patient able to complete active assist range of motion task this day with slight increase in pain. Pt able to complete all ax without v/c for proper form and positioning. Pt reports that she is compliant with home exercise program. Pt will continue to benefit from skilled OT intervention. Patient continues to demonstrate limitation  with  ROM, Joint mobility, Stiffness, Decreased functional use, Decreased strength, Continued pain, and Continued inflammation     Goals to be met in 6 weeks:  1) Pt will be independent and report performing HEP to maximize (L) shoulder functional capacity.  2) Pt will increase L shoulder AROM in all measured planes of motion by 5-10 degrees with daily task.  3) Pt will increase ability to move/handle objects AEB decreased score on QuickDASH <=40.9% (currently 56.8%)  4) Pt will report one degree less of pain with " use.  5) Pt will report use of home modalities for pain management.     Plan  Dalia to be treated by Occupational Therapy 3 times per week for 6 weeks to achieve the established goals.   Treatment to include Ultrasoud @ 3mHz, AAROM Mobilization of GH joint, PROM Home program, Ice, Moist heat, Strengthening Theraband Ex, UBE , and E- stim as well as any other treatments deemed necessary based on the patient's needs or progress.      Certification Dates: 3/8/2023 - 4/212023

## 2023-03-29 ENCOUNTER — CLINICAL SUPPORT (OUTPATIENT)
Dept: REHABILITATION | Facility: HOSPITAL | Age: 53
End: 2023-03-29
Payer: COMMERCIAL

## 2023-03-29 DIAGNOSIS — G89.29 CHRONIC LEFT SHOULDER PAIN: Primary | ICD-10-CM

## 2023-03-29 DIAGNOSIS — M25.512 CHRONIC LEFT SHOULDER PAIN: Primary | ICD-10-CM

## 2023-03-29 PROCEDURE — 97530 THERAPEUTIC ACTIVITIES: CPT | Mod: CO

## 2023-03-29 NOTE — PROGRESS NOTES
"Patient:  Dalia Bearden [facility] #:  [id number]   Date of Note: 03/29/2023   Referring Physician:  Mary Siddiqi PA-C  Diagnosis:    Encounter Diagnosis   Name Primary?    Chronic left shoulder pain Yes      Start Time: 2:00 pm  End Time: 2:58 pm  Total Time: 58 min      Subjective:   "I have no pain."  Pain: 0 out of 10     Objective:   Patient seen by ALBERT this session. Treatment  consist of the following:  manual therapy/joint mobilization, Therapeutic exercises, Therapeutic activities, and Neuromuscular re-education    Treatment:    -5 min UBE (2.5 forward and 2.5 min backwards) (NOT TODAY)  -passive range of motion in all planes of shoulder for 2 x 10 each direction  -finger latter x 10 trials in left shoulder flexion and scaption  -over head pulley in shoulder flexion and abduction for 2 min each direction  -towel slides 2 x 10 in left shoulder flexion, abduction,horizontal abduction/adduction, and rotations  -shoulder arc x 3 min with left shoulder   -Isometric ER/IR walk outs with yellow band for 3 x 10  -active range of motion in shoulder flexion, abduction, and scaption for 3 x 10    Assessment:  Patient able to complete active assist range of motion task this day with slight increase in pain. Pt able to complete all ax without v/c for proper form and positioning. Pt reports that she is compliant with home exercise program. Pt will continue to benefit from skilled OT intervention. Patient continues to demonstrate limitation  with  ROM, Joint mobility, Stiffness, Decreased functional use, Decreased strength, Continued pain, and Continued inflammation     Goals to be met in 6 weeks:  1) Pt will be independent and report performing HEP to maximize (L) shoulder functional capacity.  2) Pt will increase L shoulder AROM in all measured planes of motion by 5-10 degrees with daily task.  3) Pt will increase ability to move/handle objects AEB decreased score on QuickDASH <=40.9% (currently 56.8%)  4) Pt " will report one degree less of pain with use.  5) Pt will report use of home modalities for pain management.     Plan  Dalia to be treated by Occupational Therapy 3 times per week for 6 weeks to achieve the established goals.   Treatment to include Ultrasoud @ 3mHz, AAROM Mobilization of GH joint, PROM Home program, Ice, Moist heat, Strengthening Theraband Ex, UBE , and E- stim as well as any other treatments deemed necessary based on the patient's needs or progress.      Certification Dates: 3/8/2023 - 4/212023

## 2023-03-31 ENCOUNTER — CLINICAL SUPPORT (OUTPATIENT)
Dept: REHABILITATION | Facility: HOSPITAL | Age: 53
End: 2023-03-31
Payer: COMMERCIAL

## 2023-03-31 DIAGNOSIS — G89.29 CHRONIC LEFT SHOULDER PAIN: Primary | ICD-10-CM

## 2023-03-31 DIAGNOSIS — M25.512 CHRONIC LEFT SHOULDER PAIN: Primary | ICD-10-CM

## 2023-03-31 PROCEDURE — 97530 THERAPEUTIC ACTIVITIES: CPT | Mod: CO

## 2023-03-31 NOTE — PROGRESS NOTES
"Patient:  Dalia Bearden [facility] #:  [id number]   Date of Note: 03/31/2023   Referring Physician:  Mary Siddiqi PA-C  Diagnosis:    Encounter Diagnosis   Name Primary?    Chronic left shoulder pain Yes      Start Time: 2:14 pm  End Time: 3:00 pm  Total Time: 46 min      Subjective:   "My leg is bothering me."  Pain: 0 out of 10     Objective:   Patient seen by ALBERT this session. Treatment  consist of the following:  manual therapy/joint mobilization, Therapeutic exercises, Therapeutic activities, and Neuromuscular re-education    Treatment:    -5 min omnicycle level 5 (2.5 forward and 2.5 min backwards)   -finger latter x 10 trials in left shoulder flexion and scaption  -over head pulley in shoulder flexion and abduction for 2 min each direction  -towel slides 2 x 10 in left shoulder flexion, abduction,horizontal abduction/adduction, and rotations  -shoulder arc x 3 min with left shoulder   -Isometric ER/IR walk outs with yellow band for 3 x 10 (NOT TODAY)  -active range of motion in shoulder flexion, abduction, and scaption for 3 x 10    Assessment:  Patient able to complete active assist range of motion task this day with slight increase in pain. Pt able to complete all ax without v/c for proper form and positioning. Patient reported she was having leg pain, so ER/IR walkouts held off today because of pain in legs.  Patient reports that she is compliant with home exercise program. Pt will continue to benefit from skilled OT intervention. Patient continues to demonstrate limitation  with  ROM, Joint mobility, Stiffness, Decreased functional use, Decreased strength, Continued pain, and Continued inflammation     Goals to be met in 6 weeks:  1) Pt will be independent and report performing HEP to maximize (L) shoulder functional capacity.  2) Pt will increase L shoulder AROM in all measured planes of motion by 5-10 degrees with daily task.  3) Pt will increase ability to move/handle objects AEB decreased " score on QuickDASH <=40.9% (currently 56.8%)  4) Pt will report one degree less of pain with use.  5) Pt will report use of home modalities for pain management.     Plan  Dalia to be treated by Occupational Therapy 3 times per week for 6 weeks to achieve the established goals.   Treatment to include Ultrasoud @ 3mHz, AAROM Mobilization of GH joint, PROM Home program, Ice, Moist heat, Strengthening Theraband Ex, UBE , and E- stim as well as any other treatments deemed necessary based on the patient's needs or progress.      Certification Dates: 3/8/2023 - 4/212023

## 2023-04-03 ENCOUNTER — CLINICAL SUPPORT (OUTPATIENT)
Dept: REHABILITATION | Facility: HOSPITAL | Age: 53
End: 2023-04-03
Payer: COMMERCIAL

## 2023-04-03 DIAGNOSIS — M25.512 CHRONIC LEFT SHOULDER PAIN: Primary | ICD-10-CM

## 2023-04-03 DIAGNOSIS — G89.29 CHRONIC LEFT SHOULDER PAIN: Primary | ICD-10-CM

## 2023-04-03 PROCEDURE — 97530 THERAPEUTIC ACTIVITIES: CPT | Mod: CO

## 2023-04-03 NOTE — PROGRESS NOTES
"Patient:  Dalia Bearden [facility] #:  [id number]   Date of Note: 04/03/2023   Referring Physician:  Mary Siddiqi PA-C  Diagnosis:    Encounter Diagnosis   Name Primary?    Chronic left shoulder pain Yes      Start Time: 2:11 pm  End Time: 3:01 pm  Total Time: 50 min      Subjective:   "My leg is a little better."  Pain: 2 out of 10     Objective:   Patient seen by BETY this session. Treatment  consist of the following:  manual therapy/joint mobilization, Therapeutic exercises, Therapeutic activities, and Neuromuscular re-education    Treatment:    -5 min omnicycle level 5 (2.5 forward and 2.5 min backwards)   -finger latter x 10 trials in left shoulder flexion and scaption  -over head pulley in shoulder flexion and abduction for 2 min each direction  -towel slides 2 x 10 in left shoulder flexion, abduction,horizontal abduction/adduction, and rotations  -shoulder arc x 3 min with left shoulder   -Isometric ER/IR walk outs with yellow band for 3 x 10 (NOT TODAY)  -active range of motion in shoulder flexion, abduction, and scaption with 1 pound weights for 3 x 10    Assessment:  Patient able to complete active assist range of motion with weights task this day with slight increase in pain. Pt able to complete all ax without v/c for proper form and positioning. Patient did not complete walk-outs due to time. Patient to increase to weights with shoulder arc next session. Patient reports that she is compliant with home exercise program. Pt will continue to benefit from skilled OT intervention. Patient continues to demonstrate limitation  with  ROM, Joint mobility, Stiffness, Decreased functional use, Decreased strength, Continued pain, and Continued inflammation     Goals to be met in 6 weeks:  1) Pt will be independent and report performing HEP to maximize (L) shoulder functional capacity.  2) Pt will increase L shoulder AROM in all measured planes of motion by 5-10 degrees with daily task.  3) Pt will increase " ability to move/handle objects AEB decreased score on QuickDASH <=40.9% (currently 56.8%)  4) Pt will report one degree less of pain with use.  5) Pt will report use of home modalities for pain management.     Plan  Dalia to be treated by Occupational Therapy 3 times per week for 6 weeks to achieve the established goals.   Treatment to include Ultrasoud @ 3mHz, AAROM Mobilization of GH joint, PROM Home program, Ice, Moist heat, Strengthening Theraband Ex, UBE , and E- stim as well as any other treatments deemed necessary based on the patient's needs or progress.      Certification Dates: 3/8/2023 - 4/212023

## 2023-04-05 ENCOUNTER — CLINICAL SUPPORT (OUTPATIENT)
Dept: REHABILITATION | Facility: HOSPITAL | Age: 53
End: 2023-04-05
Payer: COMMERCIAL

## 2023-04-05 DIAGNOSIS — G89.29 CHRONIC LEFT SHOULDER PAIN: Primary | ICD-10-CM

## 2023-04-05 DIAGNOSIS — M25.512 CHRONIC LEFT SHOULDER PAIN: Primary | ICD-10-CM

## 2023-04-05 PROCEDURE — 97530 THERAPEUTIC ACTIVITIES: CPT | Mod: CO

## 2023-04-05 NOTE — PROGRESS NOTES
Patient:  Dalia Bearden [facility] #:  [id number]   Date of Note: 04/05/2023   Referring Physician:  Mary Siddiqi PA-C  Diagnosis:    Encounter Diagnosis   Name Primary?    Chronic left shoulder pain Yes      Start Time: 2:00 pm  End Time: 3:00 pm  Total Time: 60 min    Subjective:   Pt reporting no significant change or event since last visit.     Pain: 2 out of 10     Objective:   Patient seen by ALBERT this session. Treatment  consist of the following:  manual therapy/joint mobilization, Therapeutic exercises, Therapeutic activities, and Neuromuscular re-education    Treatment:  -Over head pulley in shoulder flexion and abduction for 3 min each direction  -Wall clocks with red theraband x 10 each, increase stabilization and strength of musculature  -Shoulder arc with use of 1.5# cuff wt x 3 each with left shoulder   -Rows with 2# wt with BUE, increase stabilization and strength of musculature  -x 7 min omnicycle level 5 (3.5 forward and 3.5 min backwards)   -Sh rotations with 2# ball clockwise/counterclockwise x 20 ea, increase ROM and stabilization    Assessment:  Patient reporting no pain or problems this day during therapy interventions. Pt tolerating new interventions well without reports of pain with rest breaks throughout. Pt able to complete all ax without v/c for proper form and positioning. Pt reporting she is having no difficulty with ROM during functional tasks but has difficulty with sustained endurance tasks. Pt to continue to increase and is in agreement with progressing to strengthening.    Pt will continue to benefit from skilled OT intervention. Patient continues to demonstrate limitation  with  ROM, Joint mobility, Stiffness, Decreased functional use, Decreased strength, Continued pain, and Continued inflammation     Goals to be met in 6 weeks:  1) Pt will be independent and report performing HEP to maximize (L) shoulder functional capacity.  2) Pt will increase L shoulder AROM in all  measured planes of motion by 5-10 degrees with daily task.  3) Pt will increase ability to move/handle objects AEB decreased score on QuickDASH <=40.9% (currently 56.8%)  4) Pt will report one degree less of pain with use.  5) Pt will report use of home modalities for pain management.     Plan  Dalia to be treated by Occupational Therapy 3 times per week for 6 weeks to achieve the established goals.   Treatment to include Ultrasoud @ 3mHz, AAROM Mobilization of GH joint, PROM Home program, Ice, Moist heat, Strengthening Theraband Ex, UBE , and E- stim as well as any other treatments deemed necessary based on the patient's needs or progress.      Certification Dates: 3/8/2023 - 4/212023

## 2023-04-06 ENCOUNTER — CLINICAL SUPPORT (OUTPATIENT)
Dept: REHABILITATION | Facility: HOSPITAL | Age: 53
End: 2023-04-06
Payer: COMMERCIAL

## 2023-04-06 DIAGNOSIS — G89.29 CHRONIC LEFT SHOULDER PAIN: Primary | ICD-10-CM

## 2023-04-06 DIAGNOSIS — M25.512 CHRONIC LEFT SHOULDER PAIN: Primary | ICD-10-CM

## 2023-04-06 PROCEDURE — 97530 THERAPEUTIC ACTIVITIES: CPT | Mod: CO

## 2023-04-06 NOTE — PROGRESS NOTES
Patient:  Dalia Bearden [facility] #:  [id number]   Date of Note: 04/06/2023   Referring Physician:  Mary Siddiqi PA-C  Diagnosis:    Encounter Diagnosis   Name Primary?    Chronic left shoulder pain Yes      Start Time: 2:00 pm  End Time: 2:57 pm  Total Time: 57 min    Subjective:   Pt reporting no significant change or event since last visit.     Pain: 0 out of 10     Objective:   Patient seen by ALBERT this session. Treatment  consist of the following:  manual therapy/joint mobilization, Therapeutic exercises, Therapeutic activities, and Neuromuscular re-education    Treatment:    -Wall clocks with red theraband x 10 each, increase stabilization and strength of musculature  -Shoulder arc with use of 1.5# cuff wt x 3 each with left shoulder   -Rows with 2# wt with BUE, increase stabilization and strength of musculature  -x 7 min omnicycle level 5 (3.5 forward and 3.5 min backwards)   -Sh rotations with 2# ball clockwise/counterclockwise 2 x 15 ea, increase ROM and stabilization  -min tension with RTB for bilateral shoulder flexion on wall  -shoulder range of motion with YTB in all planes for 2 x 10  -D1-D4 patterns with YTB for 2 x 10      Assessment:  Patient reporting no pain or problems this day during therapy interventions. Patient regressed to strengthening this day. Pt tolerating new interventions well without reports of pain with rest breaks throughout. Pt able to complete all ax without v/c for proper form and positioning. Pt reporting she is having no difficulty with ROM during functional tasks but has difficulty with sustained endurance tasks. Patient reported she was having muscle fatigue post session.     Pt will continue to benefit from skilled OT intervention. Patient continues to demonstrate limitation  with  ROM, Joint mobility, Stiffness, Decreased functional use, Decreased strength, Continued pain, and Continued inflammation     Goals to be met in 6 weeks:  1) Pt will be independent and  report performing HEP to maximize (L) shoulder functional capacity.  2) Pt will increase L shoulder AROM in all measured planes of motion by 5-10 degrees with daily task.  3) Pt will increase ability to move/handle objects AEB decreased score on QuickDASH <=40.9% (currently 56.8%)  4) Pt will report one degree less of pain with use.  5) Pt will report use of home modalities for pain management.     Plan  Dalia to be treated by Occupational Therapy 3 times per week for 6 weeks to achieve the established goals.   Treatment to include Ultrasoud @ 3mHz, AAROM Mobilization of GH joint, PROM Home program, Ice, Moist heat, Strengthening Theraband Ex, UBE , and E- stim as well as any other treatments deemed necessary based on the patient's needs or progress.      Certification Dates: 3/8/2023 - 4/212023

## 2023-04-12 ENCOUNTER — CLINICAL SUPPORT (OUTPATIENT)
Dept: REHABILITATION | Facility: HOSPITAL | Age: 53
End: 2023-04-12
Payer: COMMERCIAL

## 2023-04-12 DIAGNOSIS — M25.512 CHRONIC LEFT SHOULDER PAIN: Primary | ICD-10-CM

## 2023-04-12 DIAGNOSIS — G89.29 CHRONIC LEFT SHOULDER PAIN: Primary | ICD-10-CM

## 2023-04-12 PROCEDURE — 97530 THERAPEUTIC ACTIVITIES: CPT

## 2023-04-12 NOTE — PROGRESS NOTES
Patient:  Dalia Bearden [facility] #:  [id number]   Date of Note: 04/12/2023   Referring Physician:  Mary Siddiqi PA-C  Diagnosis:    Encounter Diagnosis   Name Primary?    Chronic left shoulder pain Yes        Start Time: 2:18 pm  End Time: 3:00 pm  Total Time: 42 min    Subjective:   Pt reporting no significant change or event since last visit.     Pain: 0 out of 10     Objective:   Patient seen by OT this session. Treatment  consist of the following:  manual therapy/joint mobilization, Therapeutic exercises, Therapeutic activities, and Neuromuscular re-education    Treatment:    -Wall clocks with red theraband x 10 each, increase stabilization and strength of musculature  -Shoulder arc with use of 1.5# cuff wt x 3 each with left shoulder   -Rows with 2# wt with BUE, increase stabilization and strength of musculature  -x 7 min omnicycle level 5 (3.5 forward and 3.5 min backwards)   -Sh rotations with 2# ball clockwise/counterclockwise 2 x 15 ea, increase ROM and stabilization  -min tension with RTB for bilateral shoulder flexion on wall  -shoulder range of motion with YTB in all planes for 2 x 10  -D1-D4 patterns with YTB for 2 x 10      Assessment:  Patient reporting no pain or problems this day during therapy interventions. Pt tolerating new interventions well without reports of pain with rest breaks throughout. Pt able to complete all ax without v/c for proper form and positioning. Patient reported  muscle fatigue post session.   Pt will continue to benefit from skilled OT intervention. Patient continues to demonstrate limitation  with  ROM, Joint mobility, Stiffness, Decreased functional use, Decreased strength, Continued pain, and Continued inflammation     Goals to be met in 6 weeks:  1) Pt will be independent and report performing HEP to maximize (L) shoulder functional capacity.  2) Pt will increase L shoulder AROM in all measured planes of motion by 5-10 degrees with daily task.  3) Pt will  increase ability to move/handle objects AEB decreased score on QuickDASH <=40.9% (currently 56.8%)  4) Pt will report one degree less of pain with use.  5) Pt will report use of home modalities for pain management.     Plan  Dalia to be treated by Occupational Therapy 3 times per week for 6 weeks to achieve the established goals.   Treatment to include Ultrasoud @ 3mHz, AAROM Mobilization of GH joint, PROM Home program, Ice, Moist heat, Strengthening Theraband Ex, UBE , and E- stim as well as any other treatments deemed necessary based on the patient's needs or progress.      Certification Dates: 3/8/2023 - 4/212023

## 2023-04-13 ENCOUNTER — OFFICE VISIT (OUTPATIENT)
Dept: ORTHOPEDICS | Facility: CLINIC | Age: 53
End: 2023-04-13
Payer: COMMERCIAL

## 2023-04-13 VITALS
WEIGHT: 168.63 LBS | HEART RATE: 68 BPM | DIASTOLIC BLOOD PRESSURE: 88 MMHG | SYSTOLIC BLOOD PRESSURE: 130 MMHG | HEIGHT: 66 IN | BODY MASS INDEX: 27.1 KG/M2 | OXYGEN SATURATION: 100 %

## 2023-04-13 DIAGNOSIS — G89.29 CHRONIC LEFT SHOULDER PAIN: Primary | ICD-10-CM

## 2023-04-13 DIAGNOSIS — M25.512 CHRONIC LEFT SHOULDER PAIN: Primary | ICD-10-CM

## 2023-04-13 PROCEDURE — 99999 PR PBB SHADOW E&M-EST. PATIENT-LVL IV: CPT | Mod: PBBFAC,,, | Performed by: PHYSICIAN ASSISTANT

## 2023-04-13 PROCEDURE — 3008F BODY MASS INDEX DOCD: CPT | Mod: CPTII,S$GLB,, | Performed by: PHYSICIAN ASSISTANT

## 2023-04-13 PROCEDURE — 1160F RVW MEDS BY RX/DR IN RCRD: CPT | Mod: CPTII,S$GLB,, | Performed by: PHYSICIAN ASSISTANT

## 2023-04-13 PROCEDURE — 3075F SYST BP GE 130 - 139MM HG: CPT | Mod: CPTII,S$GLB,, | Performed by: PHYSICIAN ASSISTANT

## 2023-04-13 PROCEDURE — 3079F PR MOST RECENT DIASTOLIC BLOOD PRESSURE 80-89 MM HG: ICD-10-PCS | Mod: CPTII,S$GLB,, | Performed by: PHYSICIAN ASSISTANT

## 2023-04-13 PROCEDURE — 1160F PR REVIEW ALL MEDS BY PRESCRIBER/CLIN PHARMACIST DOCUMENTED: ICD-10-PCS | Mod: CPTII,S$GLB,, | Performed by: PHYSICIAN ASSISTANT

## 2023-04-13 PROCEDURE — 3075F PR MOST RECENT SYSTOLIC BLOOD PRESS GE 130-139MM HG: ICD-10-PCS | Mod: CPTII,S$GLB,, | Performed by: PHYSICIAN ASSISTANT

## 2023-04-13 PROCEDURE — 1159F PR MEDICATION LIST DOCUMENTED IN MEDICAL RECORD: ICD-10-PCS | Mod: CPTII,S$GLB,, | Performed by: PHYSICIAN ASSISTANT

## 2023-04-13 PROCEDURE — 99213 PR OFFICE/OUTPT VISIT, EST, LEVL III, 20-29 MIN: ICD-10-PCS | Mod: S$GLB,,, | Performed by: PHYSICIAN ASSISTANT

## 2023-04-13 PROCEDURE — 99999 PR PBB SHADOW E&M-EST. PATIENT-LVL IV: ICD-10-PCS | Mod: PBBFAC,,, | Performed by: PHYSICIAN ASSISTANT

## 2023-04-13 PROCEDURE — 3079F DIAST BP 80-89 MM HG: CPT | Mod: CPTII,S$GLB,, | Performed by: PHYSICIAN ASSISTANT

## 2023-04-13 PROCEDURE — 3008F PR BODY MASS INDEX (BMI) DOCUMENTED: ICD-10-PCS | Mod: CPTII,S$GLB,, | Performed by: PHYSICIAN ASSISTANT

## 2023-04-13 PROCEDURE — 99213 OFFICE O/P EST LOW 20 MIN: CPT | Mod: S$GLB,,, | Performed by: PHYSICIAN ASSISTANT

## 2023-04-13 PROCEDURE — 1159F MED LIST DOCD IN RCRD: CPT | Mod: CPTII,S$GLB,, | Performed by: PHYSICIAN ASSISTANT

## 2023-04-13 RX ORDER — ROSUVASTATIN CALCIUM 40 MG/1
TABLET, COATED ORAL
COMMUNITY
Start: 2023-04-11

## 2023-04-13 NOTE — PROGRESS NOTES
"Subjective:      Patient ID: Dalia Valentine is a 52 y.o. female.    Chief Complaint: Follow-up and Pain of the Left Shoulder ("shocking pain in shoulder/neck area while turning head")    Review of patient's allergies indicates:   Allergen Reactions    Ibuprofen Other (See Comments)     Causes bleeding in stomach    Doxycycline Nausea And Vomiting      Pt returns to clinic for follow up of left shoulder pain.  She reports that pain is much improved following CSI.  She is still attending PT, ROM is improving as well, still having some trouble reaching behind her back.    3/1/23:  53 yo LHD F presents to clinic with c/o left shoulder pain x 3 months.  No specific injury/trauma but states pain and ROM worse after reaching out to open a door one day.  Sharp pain to anterior shoulder.  Worse with reaching overhead and especially with reaching behind back.  Taking diclofenac which helps some.  She reports that ROM has been gradually improving over past few months, however, shoulder is still painful.  No neck pain or injury.      Review of Systems   Constitutional: Negative for chills, diaphoresis and fever.   HENT:  Negative for congestion, ear discharge and ear pain.    Eyes:  Negative for blurred vision, discharge, double vision and pain.   Cardiovascular:  Negative for chest pain, claudication and cyanosis.   Respiratory:  Negative for cough, hemoptysis and shortness of breath.    Endocrine: Negative for cold intolerance and heat intolerance.   Skin:  Negative for color change, dry skin, itching and rash.   Musculoskeletal:  Positive for joint pain and muscle weakness. Negative for arthritis, back pain, falls, gout, joint swelling and neck pain.   Gastrointestinal:  Negative for abdominal pain and change in bowel habit.   Neurological:  Negative for brief paralysis, disturbances in coordination, dizziness, numbness and paresthesias.   Psychiatric/Behavioral:  Negative for altered mental status and " depression.        Objective:          General    Constitutional: She is oriented to person, place, and time. She appears well-developed and well-nourished. No distress.   HENT:   Head: Atraumatic.   Eyes: EOM are normal. Right eye exhibits no discharge. Left eye exhibits no discharge.   Cardiovascular:  Normal rate.            Pulmonary/Chest: Effort normal. No respiratory distress.   Abdominal: Soft.   Neurological: She is alert and oriented to person, place, and time.   Psychiatric: She has a normal mood and affect. Her behavior is normal.         Right Shoulder Exam     Inspection/Observation   Swelling: absent  Bruising: absent  Scars: absent  Deformity: absent    Range of Motion   Active abduction:  normal   Passive abduction:  normal   Extension:  normal   Forward Flexion:  normal   Forward Elevation: normal  Internal rotation 0 degrees:  normal     Tests & Signs   Salinas test: negative  Impingement: negative  Lift Off Sign: negative  Belly Press: negative    Other   Sensation: normal    Left Shoulder Exam     Inspection/Observation   Swelling: absent  Bruising: absent  Scars: absent  Deformity: absent    Range of Motion   Active abduction:  normal   Passive abduction:  normal   Extension:  normal   Forward Flexion:  normal   Forward Elevation: normal  Internal rotation 0 degrees:  abnormal     Tests & Signs   Salinas test: positive  Impingement: positive  Lift Off Sign: positive  Belly Press: positive    Other   Sensation: normal     Comments:  Tenderness to anterior shoulder      Vascular Exam     Right Pulses      Radial:                    2+      Left Pulses      Radial:                    2+      Capillary Refill  Right Hand: normal capillary refill  Left Hand: normal capillary refill                Assessment:         Xray Left Shoulder 3/1/23:  1. No abnormality identified.      Encounter Diagnosis   Name Primary?    Chronic left shoulder pain Yes      Chronic left shoulder pain                 Plan:          The total face-to-face encounter time with this patient was 20 minutes and greater than 50% of of the encounter time was spent counseling the patient, coordinating care, and education regarding the diagnosis. We have discussed a variety of treatment options including medications, injections, physical therapy and other alternative treatments. I also explained the indications, risks and benefits of surgery. Patient would like to continue PT. She will follow up after discharged from PT. She declines MRI at this time.    I made the decision to obtain old records of the patient including previous notes and imaging.     1. Ice compress to the affected area 2-3x a day for 15-20 minutes as needed for pain management.  2. Continue diclofenac as prescribed as needed.  3. Continue PT as scheduled.   4. RTC in 6 weeks for follow-up, sooner if needed. Consider MRI if not improved.    Patient voices understanding of and agreement with treatment plan. All of the patient's questions were answered and the patient will contact us if she has any questions or concerns in the interim.

## 2023-04-18 ENCOUNTER — CLINICAL SUPPORT (OUTPATIENT)
Dept: REHABILITATION | Facility: HOSPITAL | Age: 53
End: 2023-04-18
Payer: COMMERCIAL

## 2023-04-18 DIAGNOSIS — G89.29 CHRONIC LEFT SHOULDER PAIN: Primary | ICD-10-CM

## 2023-04-18 DIAGNOSIS — M25.512 CHRONIC LEFT SHOULDER PAIN: Primary | ICD-10-CM

## 2023-04-18 PROCEDURE — 97530 THERAPEUTIC ACTIVITIES: CPT

## 2023-04-18 NOTE — PROGRESS NOTES
Patient:  Dalia Bearden [facility] #:  [id number]   Date of Note: 04/18/2023   Referring Physician:  Mary Siddiqi PA-C  Diagnosis:    Encounter Diagnosis   Name Primary?    Chronic left shoulder pain Yes        Start Time: 2:00 pm  End Time: 2:40 pm  Total Time: 40 min    Subjective:   Pt reporting no significant change or event since last visit.     Pain: 0 out of 10     Objective:   Patient seen by OT this session. Treatment  consist of the following:  manual therapy/joint mobilization, Therapeutic exercises, Therapeutic activities, and Neuromuscular re-education    Treatment:    - Finger ladder x 10 abd/flex  - Wall clocks with red theraband x 10 each, increase stabilization and strength of musculature  - Shoulder arc with use of 1.5# cuff wt x 3 each with left shoulder   - ABC with 2# wt with LUE, increase stabilization and strength of musculature  - 7 min omnicycle level 5 (3.5 forward and 3.5 min backwards)   - Sh rotations with 2# ball clockwise/counterclockwise 2 x 15 ea, increase ROM and stabilization      Assessment:  Patient reporting no pain or problems this day during therapy interventions. Pt tolerating interventions well without reports of pain with rest breaks throughout. Pt able to complete all ax without v/c for proper form and positioning. Patient reported  muscle fatigue post session. Pt requesting to leave early due to work event.  Pt will continue to benefit from skilled OT intervention. Patient continues to demonstrate limitation  with  ROM, Joint mobility, Stiffness, Decreased functional use, Decreased strength, Continued pain, and Continued inflammation     Goals to be met in 6 weeks:  1) Pt will be independent and report performing HEP to maximize (L) shoulder functional capacity.  2) Pt will increase L shoulder AROM in all measured planes of motion by 5-10 degrees with daily task.  3) Pt will increase ability to move/handle objects AEB decreased score on QuickDASH <=40.9%  (currently 56.8%)  4) Pt will report one degree less of pain with use.  5) Pt will report use of home modalities for pain management.     Plan  Dalia to be treated by Occupational Therapy 3 times per week for 6 weeks to achieve the established goals.   Treatment to include Ultrasoud @ 3mHz, AAROM Mobilization of GH joint, PROM Home program, Ice, Moist heat, Strengthening Theraband Ex, UBE , and E- stim as well as any other treatments deemed necessary based on the patient's needs or progress.      Certification Dates: 3/8/2023 - 4/212023

## 2023-04-19 ENCOUNTER — CLINICAL SUPPORT (OUTPATIENT)
Dept: REHABILITATION | Facility: HOSPITAL | Age: 53
End: 2023-04-19
Payer: COMMERCIAL

## 2023-04-19 DIAGNOSIS — M25.512 CHRONIC LEFT SHOULDER PAIN: Primary | ICD-10-CM

## 2023-04-19 DIAGNOSIS — G89.29 CHRONIC LEFT SHOULDER PAIN: Primary | ICD-10-CM

## 2023-04-19 PROCEDURE — 97530 THERAPEUTIC ACTIVITIES: CPT

## 2023-04-19 NOTE — PROGRESS NOTES
Patient:  Dalia Beadren [facility] #:  [id number]   Date of Note: 04/19/2023   Referring Physician:  Mary Siddiqi PA-C  Diagnosis:    Encounter Diagnosis   Name Primary?    Chronic left shoulder pain Yes     Progress/Treatment note     Start Time: 2:00 pm  End Time: 3:00 pm  Total Time: 60 min    Subjective:   Pt reporting no significant change or event since last visit.     Pain: 0 out of 10     Objective:   Patient seen by OT this session. Treatment  consist of the following:  manual therapy/joint mobilization, Therapeutic exercises, Therapeutic activities, and Neuromuscular re-education    Treatment:    -Reassessment  - Finger ladder x 10 abd/flex  - Wall clocks with red theraband x 10 each, increase stabilization and strength of musculature  - Shoulder arc with use of 1.5# cuff wt x 3 each with left shoulder   - ABC with 2# wt with LUE, increase stabilization and strength of musculature  - 7 min omnicycle level 5 (3.5 forward and 3.5 min backwards)   - Sh rotations with 2# ball clockwise/counterclockwise 2 x 15 ea, increase ROM and stabilization    Range of Motion:   Right: Not tested  Left: limited as follows: (see measurements table below)       (L) UE       PROM Norm   Shoulder Flexion   0-180   Shoulder Flexion 90 120 180   Shoulder Abduction 90 110 0-180   Shoulder Extension 20 35 0-50      ROM Comments:   Hard end feel and Pain at end range        Strength  Shoulder Flexion LUE: 3-/5   Shoulder Extension LUE: 3-/5   Shoulder Abduction LUE: 3-/5   Shoulder Abbduction LUE: 3-/5   Internal Rotation LUE:  3-/5   External Rotation LUE:  3-/5   Horizontal Adduction LUE:  3-/5      Quick Dash: 56.8% 27.3%    Assessment:  Patient reporting no pain or problems this day during therapy interventions. Pt tolerating interventions well without reports of pain with rest breaks throughout. Pt able to complete all ax without v/c for proper form and positioning. Objective measurements taken and goals updated below.  Pt is progressing in all goals.  Pt will continue to benefit from skilled OT intervention. Patient continues to demonstrate limitation  with  ROM, Joint mobility, Stiffness, Decreased functional use, Decreased strength, Continued pain, and Continued inflammation     Goals to be met in 6 weeks:  1) Pt will be independent and report performing HEP to maximize (L) shoulder functional capacity. Ongoing/ MET  2) Pt will increase L shoulder AROM in all measured planes of motion by 5-10 degrees with daily task. Ongoing/ MET  3) Pt will increase ability to move/handle objects AEB decreased score on QuickDASH <=40.9% (currently 27.3%) Ongoing/ MET  4) Pt will report one degree less of pain with use. Ongoing/ MET  5) Pt will report use of home modalities for pain management. Ongoing/ MET     New goals to be completed in 6 weeks:  1) Pt will be independent and report performing HEP to maximize (L) shoulder functional capacity.   2) Pt will increase L shoulder AROM in all measured planes of motion by 3-5 degrees with daily task.   3) Pt will increase ability to move/handle objects AEB decreased score on QuickDASH <=20.5% (currently 27.3%)   4) Pt will report one degree less of pain with use.   5) Pt will report use of home modalities for pain management.     Plan  Dalia to be treated by Occupational Therapy 3 times per week for 6 weeks to achieve the established goals.   Treatment to include Ultrasoud @ 3mHz, AAROM Mobilization of GH joint, PROM Home program, Ice, Moist heat, Strengthening Theraband Ex, UBE , and E- stim as well as any other treatments deemed necessary based on the patient's needs or progress.      Certification Dates: 4/21/2023 - 6/2/2023    I certify the need for these services furnished under this plan of treatment and while under my care     ____________________________________  Physician/Referring Practitioner     _______________  Date of Signature

## 2023-04-24 ENCOUNTER — CLINICAL SUPPORT (OUTPATIENT)
Dept: REHABILITATION | Facility: HOSPITAL | Age: 53
End: 2023-04-24
Payer: COMMERCIAL

## 2023-04-24 DIAGNOSIS — G89.29 CHRONIC LEFT SHOULDER PAIN: Primary | ICD-10-CM

## 2023-04-24 DIAGNOSIS — M25.512 CHRONIC LEFT SHOULDER PAIN: Primary | ICD-10-CM

## 2023-04-24 PROCEDURE — 97530 THERAPEUTIC ACTIVITIES: CPT | Mod: CO

## 2023-04-24 PROCEDURE — 97110 THERAPEUTIC EXERCISES: CPT | Mod: CO

## 2023-04-26 ENCOUNTER — CLINICAL SUPPORT (OUTPATIENT)
Dept: REHABILITATION | Facility: HOSPITAL | Age: 53
End: 2023-04-26
Payer: COMMERCIAL

## 2023-04-26 DIAGNOSIS — M25.512 CHRONIC LEFT SHOULDER PAIN: Primary | ICD-10-CM

## 2023-04-26 DIAGNOSIS — G89.29 CHRONIC LEFT SHOULDER PAIN: Primary | ICD-10-CM

## 2023-04-26 PROCEDURE — 97530 THERAPEUTIC ACTIVITIES: CPT

## 2023-04-26 NOTE — PROGRESS NOTES
Patient:  Dalia Bearden [facility] #:  [id number]   Date of Note: 04/27/2023   Referring Physician:  Mary Siddiqi PA-C  Diagnosis:    Encounter Diagnosis   Name Primary?    Chronic left shoulder pain Yes        Start Time: 2:00 pm  End Time: 2:54 pm  Total Time: 54 min    Subjective:   Pt reporting she is doing better today.     Pain: 4 out of 10 left shoulder    Objective:   Patient seen by OT this session. Treatment  consist of the following: manual therapy/joint mobilization, Therapeutic exercises, Therapeutic activities, and Neuromuscular re-education    Treatment:    -Pulley abd 2 min  - SA row with YTB 3x10  - SA Lat Pull Down YTB 3x10  - SA IR/ER YTB 3x10  -Shoulder stabilization RSB 3x10  - SA 90 degree farmer's carry 2# MB x4 hallway laps  - 6 min omnicycle level 5 (3.5 forward and 3.5 min backwards)   -foam overhead roll on wall  in shoulder flexion  30     Assessment:  Patient reporting no pain or problems this day during therapy interventions. Pt tolerating interventions well without reports of pain with rest breaks throughout. Pt able to complete all ax without v/c for proper form and positioning.Occupational therapist /ALBERT collaboration to discuss POC, improvements, and d/c planning on todays date. Patient reports compliant with home exercise program.     Pt will continue to benefit from skilled OT intervention. Patient continues to demonstrate limitation  with  ROM, Joint mobility, Stiffness, Decreased functional use, Decreased strength, Continued pain, and Continued inflammation     New goals to be completed in 6 weeks:  1) Pt will be independent and report performing HEP to maximize (L) shoulder functional capacity.   2) Pt will increase L shoulder AROM in all measured planes of motion by 3-5 degrees with daily task.   3) Pt will increase ability to move/handle objects AEB decreased score on QuickDASH <=20.5% (currently 27.3%)   4) Pt will report one degree less of pain with use.   5) Pt  will report use of home modalities for pain management.     Plan  Dalia to be treated by Occupational Therapy 3 times per week for 6 weeks to achieve the established goals.   Treatment to include Ultrasoud @ 3mHz, AAROM Mobilization of GH joint, PROM Home program, Ice, Moist heat, Strengthening Theraband Ex, UBE , and E- stim as well as any other treatments deemed necessary based on the patient's needs or progress.      Certification Dates: 4/21/2023 - 6/2/2023

## 2023-05-01 ENCOUNTER — CLINICAL SUPPORT (OUTPATIENT)
Dept: REHABILITATION | Facility: HOSPITAL | Age: 53
End: 2023-05-01
Payer: COMMERCIAL

## 2023-05-01 DIAGNOSIS — G89.29 CHRONIC LEFT SHOULDER PAIN: Primary | ICD-10-CM

## 2023-05-01 DIAGNOSIS — M25.512 CHRONIC LEFT SHOULDER PAIN: Primary | ICD-10-CM

## 2023-05-01 PROCEDURE — 97530 THERAPEUTIC ACTIVITIES: CPT | Mod: CO

## 2023-05-01 PROCEDURE — 97110 THERAPEUTIC EXERCISES: CPT | Mod: CO

## 2023-05-01 NOTE — PROGRESS NOTES
Patient:  Dalia Bearden [facility] #:  [id number]   Date of Note: 05/01/2023   Referring Physician:  Mary Siddiqi PA-C  Diagnosis:    Encounter Diagnosis   Name Primary?    Chronic left shoulder pain Yes        Start Time: 2:16 pm  End Time: 3:00 pm  Total Time: 44 min    Subjective:   Patient called and reported she would be late because of transformation issues.     Pain: 0 out of 10 left shoulder    Objective:   Patient seen by ALBERT this session. Treatment  consist of the following: manual therapy/joint mobilization, Therapeutic exercises, Therapeutic activities, and Neuromuscular re-education    Treatment:    -Over head pulley in shoulder abductiona nd flexion for 2 min each direction  - L row with YTB 3x10  -left shoulder flexion and abduction with YTB 3 x 10 each direction  - L Lat Pull Down YTB 3x10  - L IR/ER YTB 3x10  -Shoulder stabilization with red swiss ball 3x10  - L 90 degree farmer's carry 2# MB x4 hallway laps  -foam overhead roll on wall  in shoulder flexion 3 x 10    Assessment:  Patient reporting no pain or problems this day during therapy interventions. Pt tolerating interventions well without reports of pain with rest breaks throughout. Pt able to complete all ax without v/c for proper form and positioning.Occupational therapist /ALBERT collaboration to discuss POC, improvements, and d/c planning on todays date. Patient reports compliant with home exercise program. Patient  to increase in resistance next session.     Pt will continue to benefit from skilled OT intervention. Patient continues to demonstrate limitation  with  ROM, Joint mobility, Stiffness, Decreased functional use, Decreased strength, Continued pain, and Continued inflammation     New goals to be completed in 6 weeks:  1) Pt will be independent and report performing HEP to maximize (L) shoulder functional capacity.   2) Pt will increase L shoulder AROM in all measured planes of motion by 3-5 degrees with daily task.   3)  Pt will increase ability to move/handle objects AEB decreased score on QuickDASH <=20.5% (currently 27.3%)   4) Pt will report one degree less of pain with use.   5) Pt will report use of home modalities for pain management.     Plan  Dalia to be treated by Occupational Therapy 3 times per week for 6 weeks to achieve the established goals.   Treatment to include Ultrasoud @ 3mHz, AAROM Mobilization of GH joint, PROM Home program, Ice, Moist heat, Strengthening Theraband Ex, UBE , and E- stim as well as any other treatments deemed necessary based on the patient's needs or progress.      Certification Dates: 4/21/2023 - 6/2/2023

## 2023-05-03 ENCOUNTER — CLINICAL SUPPORT (OUTPATIENT)
Dept: REHABILITATION | Facility: HOSPITAL | Age: 53
End: 2023-05-03
Payer: COMMERCIAL

## 2023-05-03 DIAGNOSIS — M25.512 CHRONIC LEFT SHOULDER PAIN: Primary | ICD-10-CM

## 2023-05-03 DIAGNOSIS — G89.29 CHRONIC LEFT SHOULDER PAIN: Primary | ICD-10-CM

## 2023-05-03 PROCEDURE — 97530 THERAPEUTIC ACTIVITIES: CPT | Mod: CO

## 2023-05-03 PROCEDURE — 97110 THERAPEUTIC EXERCISES: CPT | Mod: CO

## 2023-05-03 NOTE — PROGRESS NOTES
"Patient:  Dalia Bearden [facility] #:  [id number]   Date of Note: 05/03/2023   Referring Physician:  Mary Siddiqi PA-C  Diagnosis:    Encounter Diagnosis   Name Primary?    Chronic left shoulder pain Yes        Start Time: 2:05 pm  End Time: 3:00 pm  Total Time: 55 min    Subjective:   "Im okay."     Pain: 0 out of 10 left shoulder    Objective:   Patient seen by ALBERT this session. Treatment  consist of the following: manual therapy/joint mobilization, Therapeutic exercises, Therapeutic activities, and Neuromuscular re-education    Treatment:  -Omnicycle Level 9 min  -Over head pulley in shoulder abduction and flexion for 2 min each direction  - L row with YTB 3x10  -left shoulder flexion and abduction with YTB 3 x 10 each direction  - L Lat Pull Down YTB 3x10  - L IR/ER YTB 3x10  -Shoulder stabilization with red swiss ball 3x10    Assessment:  Patient reporting no pain or problems this day during therapy interventions. Pt tolerating interventions well without reports of pain with rest breaks throughout. Pt able to complete all ax without v/c for proper form and positioning.Occupational therapist /ALBERT collaboration to discuss POC, improvements, and d/c planning on todays date. Patient reports compliant with home exercise program. Patient  to increase in resistance next session.     Pt will continue to benefit from skilled OT intervention. Patient continues to demonstrate limitation  with  ROM, Joint mobility, Stiffness, Decreased functional use, Decreased strength, Continued pain, and Continued inflammation     New goals to be completed in 6 weeks:  1) Pt will be independent and report performing HEP to maximize (L) shoulder functional capacity.   2) Pt will increase L shoulder AROM in all measured planes of motion by 3-5 degrees with daily task.   3) Pt will increase ability to move/handle objects AEB decreased score on QuickDASH <=20.5% (currently 27.3%)   4) Pt will report one degree less of pain with " use.   5) Pt will report use of home modalities for pain management.     Plan  Dalia to be treated by Occupational Therapy 3 times per week for 6 weeks to achieve the established goals.   Treatment to include Ultrasoud @ 3mHz, AAROM Mobilization of GH joint, PROM Home program, Ice, Moist heat, Strengthening Theraband Ex, UBE , and E- stim as well as any other treatments deemed necessary based on the patient's needs or progress.      Certification Dates: 4/21/2023 - 6/2/2023

## 2023-05-05 ENCOUNTER — CLINICAL SUPPORT (OUTPATIENT)
Dept: REHABILITATION | Facility: HOSPITAL | Age: 53
End: 2023-05-05
Payer: COMMERCIAL

## 2023-05-05 DIAGNOSIS — G89.29 CHRONIC LEFT SHOULDER PAIN: Primary | ICD-10-CM

## 2023-05-05 DIAGNOSIS — M25.512 CHRONIC LEFT SHOULDER PAIN: Primary | ICD-10-CM

## 2023-05-05 PROCEDURE — 97530 THERAPEUTIC ACTIVITIES: CPT | Mod: CO

## 2023-05-05 PROCEDURE — 97110 THERAPEUTIC EXERCISES: CPT | Mod: CO

## 2023-05-05 PROCEDURE — 97140 MANUAL THERAPY 1/> REGIONS: CPT | Mod: CO

## 2023-05-05 NOTE — PROGRESS NOTES
"Patient:  Dalia Bearden [facility] #:  [id number]   Date of Note: 05/05/2023   Referring Physician:  Mary Siddiqi PA-C  Diagnosis:    Encounter Diagnosis   Name Primary?    Chronic left shoulder pain Yes        Start Time: 2:10 pm  End Time: 2:58 pm  Total Time: 48 min    Subjective:   "Im good."     Pain: 0 out of 10 left shoulder    Objective:   Patient seen by ALBERT this session. Treatment  consist of the following: manual therapy/joint mobilization, Therapeutic exercises, Therapeutic activities, and Neuromuscular re-education    Treatment:  -Omnicycle Level 7 for 6 min (3 min forward and 3 min backwards)  - L row with YTB 3x10  -left shoulder flexion and abduction with YTB 3 x 10 each direction  -IR/ER walkouts with YTB 3 x 10   -Manual Therapy done to bilateral upper traps to loosen musculature      Assessment:  Patient reporting no pain or problems this day during therapy interventions. Pt tolerating interventions well without reports of pain with rest breaks throughout. Pt able to complete all ax without v/c for proper form and positioning. Patient reported muscle tightness in traps. Post manual therapy, patient reported it felt a lot better. Occupational therapist /ALBERT collaboration to discuss POC, improvements, and d/c planning on todays date. Patient reports compliant with home exercise program. Patient  to increase in resistance next session.     Pt will continue to benefit from skilled OT intervention. Patient continues to demonstrate limitation  with  ROM, Joint mobility, Stiffness, Decreased functional use, Decreased strength, Continued pain, and Continued inflammation     New goals to be completed in 6 weeks:  1) Pt will be independent and report performing HEP to maximize (L) shoulder functional capacity.   2) Pt will increase L shoulder AROM in all measured planes of motion by 3-5 degrees with daily task.   3) Pt will increase ability to move/handle objects AEB decreased score on QuickDASH " <=20.5% (currently 27.3%)   4) Pt will report one degree less of pain with use.   5) Pt will report use of home modalities for pain management.     Plan  Dalia to be treated by Occupational Therapy 3 times per week for 6 weeks to achieve the established goals.   Treatment to include Ultrasoud @ 3mHz, AAROM Mobilization of GH joint, PROM Home program, Ice, Moist heat, Strengthening Theraband Ex, UBE , and E- stim as well as any other treatments deemed necessary based on the patient's needs or progress.      Certification Dates: 4/21/2023 - 6/2/2023

## 2023-05-08 ENCOUNTER — CLINICAL SUPPORT (OUTPATIENT)
Dept: REHABILITATION | Facility: HOSPITAL | Age: 53
End: 2023-05-08
Payer: COMMERCIAL

## 2023-05-08 DIAGNOSIS — G89.29 CHRONIC LEFT SHOULDER PAIN: Primary | ICD-10-CM

## 2023-05-08 DIAGNOSIS — M25.512 CHRONIC LEFT SHOULDER PAIN: Primary | ICD-10-CM

## 2023-05-08 PROCEDURE — 97530 THERAPEUTIC ACTIVITIES: CPT

## 2023-05-08 NOTE — PROGRESS NOTES
"Patient:  Dalia Bearden [facility] #:  [id number]   Date of Note: 05/08/2023   Referring Physician:  Mary Siddiqi PA-C  Diagnosis:    Encounter Diagnosis   Name Primary?    Chronic left shoulder pain Yes        Start Time: 2:00 pm  End Time: 2:49 pm  Total Time: 49 min    Subjective:   "Im good."     Pain: 0 out of 10 left shoulder    Objective:   Patient seen by OT this session. Treatment  consist of the following: manual therapy/joint mobilization, Therapeutic exercises, Therapeutic activities, and Neuromuscular re-education    Treatment:  -Omnicycle Level 7 for 6 min (3 min forward and 3 min backwards)  - Pully in abduction x 2 min  - L row with RTB 3x10  -left shoulder flexion and abduction with RTB 3 x 10 each direction  -IR/ER walkouts with RTB 3 x 10   - Delt raises in flex/abd/scap 2# 2x10  -Wall walks ladder 3x10 abduction      Assessment:  Patient reporting no pain or problems this day during therapy interventions. Pt tolerating increased resistance well without reports of pain with rest breaks throughout. Pt able to complete all ax without v/c for proper form and positioning. Pt requesting to leave early due to work. Occupational therapist /ALBERT collaboration to discuss POC, improvements, and d/c planning on todays date. Patient reports compliant with home exercise program. Patient  to increase in resistance next session.     Pt will continue to benefit from skilled OT intervention. Patient continues to demonstrate limitation  with  ROM, Joint mobility, Stiffness, Decreased functional use, Decreased strength, Continued pain, and Continued inflammation     New goals to be completed in 6 weeks:  1) Pt will be independent and report performing HEP to maximize (L) shoulder functional capacity.   2) Pt will increase L shoulder AROM in all measured planes of motion by 3-5 degrees with daily task.   3) Pt will increase ability to move/handle objects AEB decreased score on QuickDASH <=20.5% (currently " 27.3%)   4) Pt will report one degree less of pain with use.   5) Pt will report use of home modalities for pain management.     Plan  Dalia to be treated by Occupational Therapy 3 times per week for 6 weeks to achieve the established goals.   Treatment to include Ultrasoud @ 3mHz, AAROM Mobilization of GH joint, PROM Home program, Ice, Moist heat, Strengthening Theraband Ex, UBE , and E- stim as well as any other treatments deemed necessary based on the patient's needs or progress.      Certification Dates: 4/21/2023 - 6/2/2023

## 2023-05-10 ENCOUNTER — CLINICAL SUPPORT (OUTPATIENT)
Dept: REHABILITATION | Facility: HOSPITAL | Age: 53
End: 2023-05-10
Payer: COMMERCIAL

## 2023-05-10 DIAGNOSIS — M25.512 CHRONIC LEFT SHOULDER PAIN: Primary | ICD-10-CM

## 2023-05-10 DIAGNOSIS — G89.29 CHRONIC LEFT SHOULDER PAIN: Primary | ICD-10-CM

## 2023-05-10 PROCEDURE — 97530 THERAPEUTIC ACTIVITIES: CPT

## 2023-05-10 NOTE — PROGRESS NOTES
"Patient:  Dalia Bearden [facility] #:  [id number]   Date of Note: 05/10/2023   Referring Physician:  Mary Siddiqi PA-C  Diagnosis:    Encounter Diagnosis   Name Primary?    Chronic left shoulder pain Yes        Start Time: 2:00 pm  End Time: 3:00 pm  Total Time: 60 min    Subjective:   "Im good."     Pain: 0 out of 10 left shoulder    Objective:   Patient seen by OT this session. Treatment  consist of the following: manual therapy/joint mobilization, Therapeutic exercises, Therapeutic activities, and Neuromuscular re-education    Treatment:  -Omnicycle Level 7 for 6 min (3 min forward and 3 min backwards)  - Pully in abduction x 2 min  - L row with RTB 3x10  -left shoulder flexion and abduction with RTB 3 x 10 each direction  -IR/ER walkouts with RTB 3 x 10   - Delt raises in flex/abd/scap 2# 2x10  -Wall walks ladder 3x10 abduction  - ball rolls abduction 2x10      Assessment:  Patient reporting no pain or problems this day during therapy interventions. Pt tolerating increased resistance well without reports of pain with rest breaks throughout. Pt able to complete all ax with min v/c for proper form and positioning. Pt stating that she is functionally improving. Occupational therapist /ALBERT collaboration to discuss POC, improvements, and d/c planning on todays date. Patient reports compliant with home exercise program. Patient  to increase in resistance next session.     Pt will continue to benefit from skilled OT intervention. Patient continues to demonstrate limitation  with  ROM, Joint mobility, Stiffness, Decreased functional use, Decreased strength, Continued pain, and Continued inflammation     New goals to be completed in 6 weeks:  1) Pt will be independent and report performing HEP to maximize (L) shoulder functional capacity.   2) Pt will increase L shoulder AROM in all measured planes of motion by 3-5 degrees with daily task.   3) Pt will increase ability to move/handle objects AEB decreased score " on QuickDASH <=20.5% (currently 27.3%)   4) Pt will report one degree less of pain with use.   5) Pt will report use of home modalities for pain management.     Plan  Dalia to be treated by Occupational Therapy 3 times per week for 6 weeks to achieve the established goals.   Treatment to include Ultrasoud @ 3mHz, AAROM Mobilization of GH joint, PROM Home program, Ice, Moist heat, Strengthening Theraband Ex, UBE , and E- stim as well as any other treatments deemed necessary based on the patient's needs or progress.      Certification Dates: 4/21/2023 - 6/2/2023

## 2023-05-17 ENCOUNTER — CLINICAL SUPPORT (OUTPATIENT)
Dept: REHABILITATION | Facility: HOSPITAL | Age: 53
End: 2023-05-17
Payer: COMMERCIAL

## 2023-05-17 DIAGNOSIS — G89.29 CHRONIC LEFT SHOULDER PAIN: Primary | ICD-10-CM

## 2023-05-17 DIAGNOSIS — M25.512 CHRONIC LEFT SHOULDER PAIN: Primary | ICD-10-CM

## 2023-05-17 PROCEDURE — 97530 THERAPEUTIC ACTIVITIES: CPT

## 2023-05-17 NOTE — PROGRESS NOTES
"Patient:  Dalia Bearden [facility] #:  [id number]   Date of Note: 05/17/2023   Referring Physician:  Mary Siddiqi PA-C  Diagnosis:    Encounter Diagnosis   Name Primary?    Chronic left shoulder pain Yes        Start Time: 2:10 pm  End Time: 3:05 pm  Total Time: 55 min    Subjective:   "Im good."     Pain: 0 out of 10 left shoulder    Objective:   Patient seen by OT this session. Treatment  consist of the following: manual therapy/joint mobilization, Therapeutic exercises, Therapeutic activities, and Neuromuscular re-education    Treatment:  -Omnicycle Level 7 for 6 min (3 min forward and 3 min backwards)  - Pully in abduction x 2 min  - L row with GTB 3x10  -left shoulder flexion and abduction with RTB 3 x 10 each direction  -IR/ER walkouts with RTB 3 x 10   - Delt raises in flex/abd/scap 3# 2x10  -Wall walks ladder 3x10 abduction  - ball rolls abduction 2x10  -90 degree farmer carry 2# 5 laps      Assessment:  Patient reporting no pain or problems this day during therapy interventions. Pt tolerating increased resistance well without reports of pain with min rest breaks throughout. Pt able to complete all ax with min v/c for proper form and positioning. Occupational therapist /ALBERT collaboration to discuss POC, improvements, and d/c planning on todays date.     Pt will continue to benefit from skilled OT intervention. Patient continues to demonstrate limitation  with  ROM, Joint mobility, Stiffness, Decreased functional use, Decreased strength, Continued pain, and Continued inflammation     New goals to be completed in 6 weeks:  1) Pt will be independent and report performing HEP to maximize (L) shoulder functional capacity.   2) Pt will increase L shoulder AROM in all measured planes of motion by 3-5 degrees with daily task.   3) Pt will increase ability to move/handle objects AEB decreased score on QuickDASH <=20.5% (currently 27.3%)   4) Pt will report one degree less of pain with use.   5) Pt will " report use of home modalities for pain management.     Plan  Dalia to be treated by Occupational Therapy 3 times per week for 6 weeks to achieve the established goals.   Treatment to include Ultrasoud @ 3mHz, AAROM Mobilization of GH joint, PROM Home program, Ice, Moist heat, Strengthening Theraband Ex, UBE , and E- stim as well as any other treatments deemed necessary based on the patient's needs or progress.      Certification Dates: 4/21/2023 - 6/2/2023

## 2023-05-22 ENCOUNTER — DOCUMENTATION ONLY (OUTPATIENT)
Dept: REHABILITATION | Facility: HOSPITAL | Age: 53
End: 2023-05-22
Payer: COMMERCIAL

## 2023-05-24 ENCOUNTER — CLINICAL SUPPORT (OUTPATIENT)
Dept: REHABILITATION | Facility: HOSPITAL | Age: 53
End: 2023-05-24
Payer: COMMERCIAL

## 2023-05-24 DIAGNOSIS — M25.512 CHRONIC LEFT SHOULDER PAIN: Primary | ICD-10-CM

## 2023-05-24 DIAGNOSIS — G89.29 CHRONIC LEFT SHOULDER PAIN: Primary | ICD-10-CM

## 2023-05-24 PROCEDURE — 97530 THERAPEUTIC ACTIVITIES: CPT

## 2023-05-24 NOTE — PROGRESS NOTES
"Patient:  Dalia Bearden [facility] #:  [id number]   Date of Note: 05/24/2023   Referring Physician:  Mary Siddiqi PA-C  Diagnosis:    Encounter Diagnosis   Name Primary?    Chronic left shoulder pain Yes        Start Time: 2:00 pm  End Time: 3:00 pm  Total Time: 60 min    Subjective:   "Im good."     Pain: 1 out of 10 left shoulder    Objective:   Patient seen by OT this session. Treatment  consist of the following: manual therapy/joint mobilization, Therapeutic exercises, Therapeutic activities, and Neuromuscular re-education    Treatment:  -Omnicycle Level 7 for 6 min (3 min forward and 3 min backwards)  - Pully in abduction x 2 min  - L row with GTB 3x10  -left shoulder flexion and abduction with RTB 3 x 10 each direction  -IR/ER walkouts with RTB 3 x 10   - reassessment    Range of Motion:   Right: Not tested  Left: limited as follows: (see measurements table below)       (L) UE       AROM Norm   Shoulder Flexion   0-180   Shoulder Flexion 90 120 160 180   Shoulder Abduction 90 110 110 0-180   Shoulder Extension 20 35 36 0-50      ROM Comments:   Hard end feel and Pain at end range        Strength  Shoulder Flexion LUE: 4-/5   Shoulder Extension LUE: 4-/5   Shoulder Abduction LUE: 4-/5   Shoulder Abbduction LUE: 4-/5   Internal Rotation LUE:  4-/5   External Rotation LUE:  4-/5   Horizontal Adduction LUE:  4-/5      Quick Dash: 56.8% 27.3% 18.2%    Assessment:  Patient reporting little pain this day during therapy interventions due to an incident during the weekend. Pt tolerating increased resistance well without reports of pain with min rest breaks throughout. Pt objective measurements taken and goals updated below. Pt would benefit from a continuation in OT POC.    Pt will continue to benefit from skilled OT intervention. Patient continues to demonstrate limitation  with  ROM, Joint mobility, Stiffness, Decreased functional use, Decreased strength, Continued pain, and Continued inflammation     Goals " to be completed in 6 weeks:  1) Pt will be independent and report performing HEP to maximize (L) shoulder functional capacity. Progressing/Ongoing  2) Pt will increase L shoulder AROM in all measured planes of motion by 3-5 degrees with daily task. Partially met/Ongoing  3) Pt will increase ability to move/handle objects AEB decreased score on QuickDASH <=20.5% (currently 18.2%) Progressing/Ongoing  4) Pt will report one degree less of pain with use. Progressing/Ongoing  5) Pt will report use of home modalities for pain management. Progressing/Ongoing      New goals to be completed in 6 weeks:  1) Pt will be independent and report performing HEP to maximize (L) shoulder functional capacity.   2) Pt will increase L shoulder AROM in all measured planes of motion by 3-5 degrees with daily task.   3) Pt will increase ability to move/handle objects AEB decreased score on QuickDASH <=20.5% (currently 27.3%)   4) Pt will report one degree less of pain with use.   5) Pt will report use of home modalities for pain management.     Plan  Dalia to be treated by Occupational Therapy 2 times per week for 6 weeks to achieve the established goals.   Treatment to include Ultrasoud @ 3mHz, AAROM Mobilization of GH joint, PROM Home program, Ice, Moist heat, Strengthening Theraband Ex, UBE , and E- stim as well as any other treatments deemed necessary based on the patient's needs or progress.      Certification Dates: 6/3/2023 - 7/14/2023    I certify the need for these services furnished under this plan of treatment and while under my care     ____________________________________  Physician/Referring Practitioner     _______________  Date of Signature

## 2023-06-08 ENCOUNTER — CLINICAL SUPPORT (OUTPATIENT)
Dept: REHABILITATION | Facility: HOSPITAL | Age: 53
End: 2023-06-08
Payer: COMMERCIAL

## 2023-06-08 DIAGNOSIS — G89.29 CHRONIC LEFT SHOULDER PAIN: Primary | ICD-10-CM

## 2023-06-08 DIAGNOSIS — M25.512 CHRONIC LEFT SHOULDER PAIN: Primary | ICD-10-CM

## 2023-06-08 PROCEDURE — 97530 THERAPEUTIC ACTIVITIES: CPT

## 2023-06-08 NOTE — PROGRESS NOTES
"Patient:  Dalia Bearden [facility] #:  [id number]   Date of Note: 06/08/2023   Referring Physician:  Mary Siddiqi PA-C  Diagnosis:    Encounter Diagnosis   Name Primary?    Chronic left shoulder pain Yes          Start Time: 11:10 am  End Time: 12:10 pm  Total Time: 60 min    Subjective:   "Im good."     Pain: 1 out of 10 left shoulder    Objective:   Patient seen by OT this session. Treatment  consist of the following: manual therapy/joint mobilization, Therapeutic exercises, Therapeutic activities, and Neuromuscular re-education     Treatment:  -Omnicycle Level 7 for 6 min (3 min forward and 3 min backwards)  - Pully in abduction x 2 min  - L row with GTB 3x10  - left shoulder flexion and abduction with RTB 3 x 10 each direction  - IR/ER walkouts with RTB 3 x 10     Assessment:  Patient reporting little pain this day during therapy interventions due to an incident during the weekend. Pt tolerating resistance well without reports of pain with min rest breaks throughout.Pt reporting pain during external walk outs with GTB. Pt would benefit from a continuation in OT POC.    Pt will continue to benefit from skilled OT intervention. Patient continues to demonstrate limitation  with  ROM, Joint mobility, Stiffness, Decreased functional use, Decreased strength, Continued pain, and Continued inflammation    Goals to be completed in 6 weeks:  1) Pt will be independent and report performing HEP to maximize (L) shoulder functional capacity.   2) Pt will increase L shoulder AROM in all measured planes of motion by 3-5 degrees with daily task.   3) Pt will increase ability to move/handle objects AEB decreased score on QuickDASH <=20.5% (currently 27.3%)   4) Pt will report one degree less of pain with use.   5) Pt will report use of home modalities for pain management.     Plan  aDlia to be treated by Occupational Therapy 2 times per week for 6 weeks to achieve the established goals.   Treatment to include Ultrasoud " @ 3mHz, AAROM Mobilization of GH joint, PROM Home program, Ice, Moist heat, Strengthening Theraband Ex, UBE , and E- stim as well as any other treatments deemed necessary based on the patient's needs or progress.      Certification Dates: 6/3/2023 - 7/14/2023

## 2023-06-12 ENCOUNTER — CLINICAL SUPPORT (OUTPATIENT)
Dept: REHABILITATION | Facility: HOSPITAL | Age: 53
End: 2023-06-12
Payer: COMMERCIAL

## 2023-06-12 DIAGNOSIS — M25.512 CHRONIC LEFT SHOULDER PAIN: Primary | ICD-10-CM

## 2023-06-12 DIAGNOSIS — G89.29 CHRONIC LEFT SHOULDER PAIN: Primary | ICD-10-CM

## 2023-06-12 PROCEDURE — 97530 THERAPEUTIC ACTIVITIES: CPT | Mod: CO

## 2023-06-12 PROCEDURE — 97110 THERAPEUTIC EXERCISES: CPT | Mod: CO

## 2023-06-12 PROCEDURE — 97112 NEUROMUSCULAR REEDUCATION: CPT | Mod: CO

## 2023-06-12 NOTE — PROGRESS NOTES
"Patient:  Dalia Bearden [facility] #:  [id number]   Date of Note: 06/12/2023   Referring Physician:  Mary Siddiqi PA-C  Diagnosis:    Encounter Diagnosis   Name Primary?    Chronic left shoulder pain Yes          Start Time: 10:15 AM  End Time: 11:00 AM  Total Time: 45 min    Subjective:   "My leg is hurting today."     Pain: 2 out of 10 left shoulder    Objective:   Patient seen by ALBERT this session. Treatment  consist of the following: manual therapy/joint mobilization, Therapeutic exercises, Therapeutic activities, and Neuromuscular re-education     Treatment:  -Omnicycle Level 8 for 6 min (3 min forward and 3 min backwards) for neuro re-education   - Pully in abduction x 3 min x 2 trials for neuro re-education  - Y, reverse Y, and Ts with doubled YTB 3x10  - left shoulder flexion and abduction with RTB 3 x 10 each direction  - Low rows with GTB for 3 x 10  -IR/ER with RTB for 2 x 10    Assessment:  Pt tolerating resistance well without reports of pain with min rest breaks throughout. Patient able to increase resistance on Omnicycle this day with no report of increase in pain. Patient reported most difficult exercise this day was Y's with YTB. Pt reporting pain during external walk outs with GTB. Pt would benefit from a continuation in OT POC. Occupational therapist /ALBERT collaboration to discuss POC, improvements, and d/c planning on todays date.  Patient reported she is compliant with home exercise program.     Pt will continue to benefit from skilled OT intervention. Patient continues to demonstrate limitation  with  ROM, Joint mobility, Stiffness, Decreased functional use, Decreased strength, Continued pain, and Continued inflammation    Goals to be completed in 6 weeks:  1) Pt will be independent and report performing HEP to maximize (L) shoulder functional capacity.   2) Pt will increase L shoulder AROM in all measured planes of motion by 3-5 degrees with daily task.   3) Pt will increase ability " to move/handle objects AEB decreased score on QuickDASH <=20.5% (currently 27.3%)   4) Pt will report one degree less of pain with use.   5) Pt will report use of home modalities for pain management.     Plan  Dalia to be treated by Occupational Therapy 2 times per week for 6 weeks to achieve the established goals.   Treatment to include Ultrasoud @ 3mHz, AAROM Mobilization of GH joint, PROM Home program, Ice, Moist heat, Strengthening Theraband Ex, UBE , and E- stim as well as any other treatments deemed necessary based on the patient's needs or progress.      Certification Dates: 6/3/2023 - 7/14/2023

## 2023-06-13 ENCOUNTER — OFFICE VISIT (OUTPATIENT)
Dept: ORTHOPEDICS | Facility: CLINIC | Age: 53
End: 2023-06-13
Payer: COMMERCIAL

## 2023-06-13 VITALS
BODY MASS INDEX: 28.16 KG/M2 | SYSTOLIC BLOOD PRESSURE: 124 MMHG | HEART RATE: 65 BPM | DIASTOLIC BLOOD PRESSURE: 82 MMHG | WEIGHT: 175.25 LBS | OXYGEN SATURATION: 98 % | HEIGHT: 66 IN

## 2023-06-13 DIAGNOSIS — G89.29 CHRONIC LEFT SHOULDER PAIN: Primary | ICD-10-CM

## 2023-06-13 DIAGNOSIS — M25.512 CHRONIC LEFT SHOULDER PAIN: Primary | ICD-10-CM

## 2023-06-13 PROCEDURE — 1159F MED LIST DOCD IN RCRD: CPT | Mod: CPTII,S$GLB,, | Performed by: PHYSICIAN ASSISTANT

## 2023-06-13 PROCEDURE — 99999 PR PBB SHADOW E&M-EST. PATIENT-LVL V: CPT | Mod: PBBFAC,,, | Performed by: PHYSICIAN ASSISTANT

## 2023-06-13 PROCEDURE — 1160F RVW MEDS BY RX/DR IN RCRD: CPT | Mod: CPTII,S$GLB,, | Performed by: PHYSICIAN ASSISTANT

## 2023-06-13 PROCEDURE — 3008F PR BODY MASS INDEX (BMI) DOCUMENTED: ICD-10-PCS | Mod: CPTII,S$GLB,, | Performed by: PHYSICIAN ASSISTANT

## 2023-06-13 PROCEDURE — 99999 PR PBB SHADOW E&M-EST. PATIENT-LVL V: ICD-10-PCS | Mod: PBBFAC,,, | Performed by: PHYSICIAN ASSISTANT

## 2023-06-13 PROCEDURE — 3079F DIAST BP 80-89 MM HG: CPT | Mod: CPTII,S$GLB,, | Performed by: PHYSICIAN ASSISTANT

## 2023-06-13 PROCEDURE — 1159F PR MEDICATION LIST DOCUMENTED IN MEDICAL RECORD: ICD-10-PCS | Mod: CPTII,S$GLB,, | Performed by: PHYSICIAN ASSISTANT

## 2023-06-13 PROCEDURE — 3074F SYST BP LT 130 MM HG: CPT | Mod: CPTII,S$GLB,, | Performed by: PHYSICIAN ASSISTANT

## 2023-06-13 PROCEDURE — 99213 PR OFFICE/OUTPT VISIT, EST, LEVL III, 20-29 MIN: ICD-10-PCS | Mod: S$GLB,,, | Performed by: PHYSICIAN ASSISTANT

## 2023-06-13 PROCEDURE — 3074F PR MOST RECENT SYSTOLIC BLOOD PRESSURE < 130 MM HG: ICD-10-PCS | Mod: CPTII,S$GLB,, | Performed by: PHYSICIAN ASSISTANT

## 2023-06-13 PROCEDURE — 99213 OFFICE O/P EST LOW 20 MIN: CPT | Mod: S$GLB,,, | Performed by: PHYSICIAN ASSISTANT

## 2023-06-13 PROCEDURE — 1160F PR REVIEW ALL MEDS BY PRESCRIBER/CLIN PHARMACIST DOCUMENTED: ICD-10-PCS | Mod: CPTII,S$GLB,, | Performed by: PHYSICIAN ASSISTANT

## 2023-06-13 PROCEDURE — 3008F BODY MASS INDEX DOCD: CPT | Mod: CPTII,S$GLB,, | Performed by: PHYSICIAN ASSISTANT

## 2023-06-13 PROCEDURE — 3079F PR MOST RECENT DIASTOLIC BLOOD PRESSURE 80-89 MM HG: ICD-10-PCS | Mod: CPTII,S$GLB,, | Performed by: PHYSICIAN ASSISTANT

## 2023-06-13 NOTE — PROGRESS NOTES
"Subjective:      Patient ID: Dalia Valentine is a 52 y.o. female.    Chief Complaint: Pain and Swelling of the Left Shoulder ("can barely move arm up and tingling that goes down into my fingers")    Review of patient's allergies indicates:   Allergen Reactions    Ibuprofen Other (See Comments)     Causes bleeding in stomach    Doxycycline Nausea And Vomiting      Pt returns to clinic for follow up of left shoulder pain.  She has been attending PT but is still having difficulty with ROM    4/13/23:  Pt returns to clinic for follow up of left shoulder pain.  She reports that pain is much improved following CSI.  She is still attending PT, ROM is improving as well, still having some trouble reaching behind her back.    3/1/23:  51 yo LHD F presents to clinic with c/o left shoulder pain x 3 months.  No specific injury/trauma but states pain and ROM worse after reaching out to open a door one day.  Sharp pain to anterior shoulder.  Worse with reaching overhead and especially with reaching behind back.  Taking diclofenac which helps some.  She reports that ROM has been gradually improving over past few months, however, shoulder is still painful.  No neck pain or injury.      Review of Systems   Constitutional: Negative for chills, diaphoresis and fever.   HENT:  Negative for congestion, ear discharge and ear pain.    Eyes:  Negative for blurred vision, discharge, double vision and pain.   Cardiovascular:  Negative for chest pain, claudication and cyanosis.   Respiratory:  Negative for cough, hemoptysis and shortness of breath.    Endocrine: Negative for cold intolerance and heat intolerance.   Skin:  Negative for color change, dry skin, itching and rash.   Musculoskeletal:  Positive for joint pain and muscle weakness. Negative for arthritis, back pain, falls, gout, joint swelling and neck pain.   Gastrointestinal:  Negative for abdominal pain and change in bowel habit.   Neurological:  Negative for brief " paralysis, disturbances in coordination, dizziness, numbness and paresthesias.   Psychiatric/Behavioral:  Negative for altered mental status and depression.        Objective:          General    Constitutional: She is oriented to person, place, and time. She appears well-developed and well-nourished. No distress.   HENT:   Head: Atraumatic.   Eyes: EOM are normal. Right eye exhibits no discharge. Left eye exhibits no discharge.   Cardiovascular:  Normal rate.            Pulmonary/Chest: Effort normal. No respiratory distress.   Abdominal: Soft.   Neurological: She is alert and oriented to person, place, and time.   Psychiatric: She has a normal mood and affect. Her behavior is normal.         Right Shoulder Exam     Inspection/Observation   Swelling: absent  Bruising: absent  Scars: absent  Deformity: absent    Range of Motion   Active abduction:  normal   Passive abduction:  normal   Extension:  normal   Forward Flexion:  normal   Forward Elevation: normal  Internal rotation 0 degrees:  normal     Tests & Signs   Salinas test: negative  Impingement: negative  Lift Off Sign: negative  Belly Press: negative    Other   Sensation: normal    Left Shoulder Exam     Inspection/Observation   Swelling: absent  Bruising: absent  Scars: absent  Deformity: absent    Range of Motion   Active abduction:  normal   Passive abduction:  normal   Extension:  normal   Forward Flexion:  normal   Forward Elevation: normal  Internal rotation 0 degrees:  abnormal     Tests & Signs   Salinas test: positive  Impingement: positive  Lift Off Sign: positive  Belly Press: positive    Other   Sensation: normal     Comments:  Tenderness to anterior shoulder      Vascular Exam     Right Pulses      Radial:                    2+      Left Pulses      Radial:                    2+      Capillary Refill  Right Hand: normal capillary refill  Left Hand: normal capillary refill                Assessment:         Xray Left Shoulder 3/1/23:  1. No  abnormality identified.      Encounter Diagnosis   Name Primary?    Chronic left shoulder pain Yes      Chronic left shoulder pain  -     MRI Shoulder Without Contrast Left; Future; Expected date: 06/13/2023                 Plan:         The total face-to-face encounter time with this patient was 20 minutes and greater than 50% of of the encounter time was spent counseling the patient, coordinating care, and education regarding the diagnosis. We have discussed a variety of treatment options including medications, injections, physical therapy and other alternative treatments. I also explained the indications, risks and benefits of surgery. Patient would like to proceed with MRI at this time.    I made the decision to obtain old records of the patient including previous notes and imaging.     MRI left shoulder.  2. Ice compress to the affected area 2-3x a day for 15-20 minutes as needed for pain management.  3. Continue diclofenac as prescribed as needed.  4. RTC after MRI for results and follow up, sooner if needed.    Patient voices understanding of and agreement with treatment plan. All of the patient's questions were answered and the patient will contact us if she has any questions or concerns in the interim.

## 2023-06-14 ENCOUNTER — CLINICAL SUPPORT (OUTPATIENT)
Dept: REHABILITATION | Facility: HOSPITAL | Age: 53
End: 2023-06-14
Payer: COMMERCIAL

## 2023-06-14 DIAGNOSIS — M25.512 CHRONIC LEFT SHOULDER PAIN: Primary | ICD-10-CM

## 2023-06-14 DIAGNOSIS — G89.29 CHRONIC LEFT SHOULDER PAIN: Primary | ICD-10-CM

## 2023-06-14 PROCEDURE — 97110 THERAPEUTIC EXERCISES: CPT | Mod: CO

## 2023-06-14 PROCEDURE — 97112 NEUROMUSCULAR REEDUCATION: CPT | Mod: CO

## 2023-06-14 PROCEDURE — 97530 THERAPEUTIC ACTIVITIES: CPT | Mod: CO

## 2023-06-14 NOTE — PROGRESS NOTES
"Patient:  Dalia Bearden [facility] #:  [id number]   Date of Note: 06/14/2023   Referring Physician:  Mary Siddiqi PA-C  Diagnosis:    Encounter Diagnosis   Name Primary?    Chronic left shoulder pain Yes          Start Time: 10:12 AM  End Time: 10:58 AM  Total Time: 46 min    Subjective:   "I'm alright."     Pain: 0 out of 10 left shoulder    Objective:   Patient seen by ALBERT this session. Treatment  consist of the following: manual therapy/joint mobilization, Therapeutic exercises, Therapeutic activities, and Neuromuscular re-education     Treatment:  -Omnicycle Level 8 for 6 min (3 min forward and 3 min backwards) for neuro re-education   - Pully in abduction x 3 min x 2 trials for neuro re-education  - Y, reverse Y, and Ts with doubled YTB 3 x 10  - left shoulder flexion and abduction with RTB 3 x 10 each direction  - Low rows with GTB for 3 x 10  -IR/ER with RTB for 2 x 10    Assessment:  Pt tolerating resistance well without reports of pain with min rest breaks throughout. Patient able to increase resistance on Omnicycle this day with no report of increase in pain. Patient able to increase to RTB with Y, Ts, and reverse Ys. Pt reporting pain during external walk outs with GTB. Pt would benefit from a continuation in OT POC. Patient reported slight pain inside shoulder joint. Occupational therapist /ALBERT collaboration to discuss POC, improvements, and d/c planning on todays date.  Patient reported she is compliant with home exercise program.     Pt will continue to benefit from skilled OT intervention. Patient continues to demonstrate limitation  with  ROM, Joint mobility, Stiffness, Decreased functional use, Decreased strength, Continued pain, and Continued inflammation    Goals to be completed in 6 weeks:  1) Pt will be independent and report performing HEP to maximize (L) shoulder functional capacity.   2) Pt will increase L shoulder AROM in all measured planes of motion by 3-5 degrees with daily " task.   3) Pt will increase ability to move/handle objects AEB decreased score on QuickDASH <=20.5% (currently 27.3%)   4) Pt will report one degree less of pain with use.   5) Pt will report use of home modalities for pain management.     Plan  Dalia to be treated by Occupational Therapy 2 times per week for 6 weeks to achieve the established goals.   Treatment to include Ultrasoud @ 3mHz, AAROM Mobilization of GH joint, PROM Home program, Ice, Moist heat, Strengthening Theraband Ex, UBE , and E- stim as well as any other treatments deemed necessary based on the patient's needs or progress.      Certification Dates: 6/3/2023 - 7/14/2023

## 2023-06-21 ENCOUNTER — TELEPHONE (OUTPATIENT)
Dept: REHABILITATION | Facility: HOSPITAL | Age: 53
End: 2023-06-21
Payer: COMMERCIAL

## 2023-06-22 ENCOUNTER — HOSPITAL ENCOUNTER (OUTPATIENT)
Dept: RADIOLOGY | Facility: HOSPITAL | Age: 53
Discharge: HOME | End: 2023-06-22
Attending: PHYSICIAN ASSISTANT
Payer: COMMERCIAL

## 2023-06-22 DIAGNOSIS — G89.29 CHRONIC LEFT SHOULDER PAIN: ICD-10-CM

## 2023-06-22 DIAGNOSIS — M25.512 CHRONIC LEFT SHOULDER PAIN: ICD-10-CM

## 2023-06-22 PROCEDURE — 73221 MRI SHOULDER WITHOUT CONTRAST LEFT: ICD-10-PCS | Mod: 26,LT,, | Performed by: RADIOLOGY

## 2023-06-22 PROCEDURE — 73221 MRI JOINT UPR EXTREM W/O DYE: CPT | Mod: TC,LT

## 2023-06-22 PROCEDURE — 73221 MRI JOINT UPR EXTREM W/O DYE: CPT | Mod: 26,LT,, | Performed by: RADIOLOGY

## 2023-06-23 ENCOUNTER — OFFICE VISIT (OUTPATIENT)
Dept: ORTHOPEDICS | Facility: CLINIC | Age: 53
End: 2023-06-23
Payer: COMMERCIAL

## 2023-06-23 ENCOUNTER — TELEPHONE (OUTPATIENT)
Dept: REHABILITATION | Facility: HOSPITAL | Age: 53
End: 2023-06-23
Payer: COMMERCIAL

## 2023-06-23 VITALS
BODY MASS INDEX: 28.27 KG/M2 | SYSTOLIC BLOOD PRESSURE: 132 MMHG | OXYGEN SATURATION: 98 % | WEIGHT: 175.88 LBS | DIASTOLIC BLOOD PRESSURE: 74 MMHG | HEART RATE: 78 BPM | HEIGHT: 66 IN

## 2023-06-23 DIAGNOSIS — G89.29 CHRONIC LEFT SHOULDER PAIN: Primary | ICD-10-CM

## 2023-06-23 DIAGNOSIS — M25.512 CHRONIC LEFT SHOULDER PAIN: Primary | ICD-10-CM

## 2023-06-23 PROCEDURE — 99213 OFFICE O/P EST LOW 20 MIN: CPT | Mod: S$GLB,,, | Performed by: PHYSICIAN ASSISTANT

## 2023-06-23 PROCEDURE — 3075F PR MOST RECENT SYSTOLIC BLOOD PRESS GE 130-139MM HG: ICD-10-PCS | Mod: CPTII,S$GLB,, | Performed by: PHYSICIAN ASSISTANT

## 2023-06-23 PROCEDURE — 3008F BODY MASS INDEX DOCD: CPT | Mod: CPTII,S$GLB,, | Performed by: PHYSICIAN ASSISTANT

## 2023-06-23 PROCEDURE — 3008F PR BODY MASS INDEX (BMI) DOCUMENTED: ICD-10-PCS | Mod: CPTII,S$GLB,, | Performed by: PHYSICIAN ASSISTANT

## 2023-06-23 PROCEDURE — 1160F RVW MEDS BY RX/DR IN RCRD: CPT | Mod: CPTII,S$GLB,, | Performed by: PHYSICIAN ASSISTANT

## 2023-06-23 PROCEDURE — 1159F PR MEDICATION LIST DOCUMENTED IN MEDICAL RECORD: ICD-10-PCS | Mod: CPTII,S$GLB,, | Performed by: PHYSICIAN ASSISTANT

## 2023-06-23 PROCEDURE — 3078F PR MOST RECENT DIASTOLIC BLOOD PRESSURE < 80 MM HG: ICD-10-PCS | Mod: CPTII,S$GLB,, | Performed by: PHYSICIAN ASSISTANT

## 2023-06-23 PROCEDURE — 3078F DIAST BP <80 MM HG: CPT | Mod: CPTII,S$GLB,, | Performed by: PHYSICIAN ASSISTANT

## 2023-06-23 PROCEDURE — 1159F MED LIST DOCD IN RCRD: CPT | Mod: CPTII,S$GLB,, | Performed by: PHYSICIAN ASSISTANT

## 2023-06-23 PROCEDURE — 99999 PR PBB SHADOW E&M-EST. PATIENT-LVL IV: ICD-10-PCS | Mod: PBBFAC,,, | Performed by: PHYSICIAN ASSISTANT

## 2023-06-23 PROCEDURE — 99213 PR OFFICE/OUTPT VISIT, EST, LEVL III, 20-29 MIN: ICD-10-PCS | Mod: S$GLB,,, | Performed by: PHYSICIAN ASSISTANT

## 2023-06-23 PROCEDURE — 1160F PR REVIEW ALL MEDS BY PRESCRIBER/CLIN PHARMACIST DOCUMENTED: ICD-10-PCS | Mod: CPTII,S$GLB,, | Performed by: PHYSICIAN ASSISTANT

## 2023-06-23 PROCEDURE — 3075F SYST BP GE 130 - 139MM HG: CPT | Mod: CPTII,S$GLB,, | Performed by: PHYSICIAN ASSISTANT

## 2023-06-23 PROCEDURE — 99999 PR PBB SHADOW E&M-EST. PATIENT-LVL IV: CPT | Mod: PBBFAC,,, | Performed by: PHYSICIAN ASSISTANT

## 2023-06-23 RX ORDER — RIMEGEPANT SULFATE 75 MG/75MG
TABLET, ORALLY DISINTEGRATING ORAL
COMMUNITY
Start: 2023-04-19

## 2023-06-23 RX ORDER — ALBUTEROL SULFATE 90 UG/1
AEROSOL, METERED RESPIRATORY (INHALATION)
COMMUNITY

## 2023-06-23 RX ORDER — ROPINIROLE 0.25 MG/1
0.25 TABLET, FILM COATED ORAL NIGHTLY
COMMUNITY
Start: 2023-06-12

## 2023-06-23 NOTE — PROGRESS NOTES
Subjective:      Patient ID: Dalia Valentine is a 52 y.o. female.    Chief Complaint: Results    Review of patient's allergies indicates:   Allergen Reactions    Ibuprofen Other (See Comments)     Causes bleeding in stomach    Doxycycline Nausea And Vomiting      Pt returns to clinic for follow up of left shoulder pain and MRI results.  No change in sx from last visit.    6/13/23:  Pt returns to clinic for follow up of left shoulder pain.  She has been attending PT but is still having difficulty with ROM    4/13/23:  Pt returns to clinic for follow up of left shoulder pain.  She reports that pain is much improved following CSI.  She is still attending PT, ROM is improving as well, still having some trouble reaching behind her back.    3/1/23:  53 yo LHD F presents to clinic with c/o left shoulder pain x 3 months.  No specific injury/trauma but states pain and ROM worse after reaching out to open a door one day.  Sharp pain to anterior shoulder.  Worse with reaching overhead and especially with reaching behind back.  Taking diclofenac which helps some.  She reports that ROM has been gradually improving over past few months, however, shoulder is still painful.  No neck pain or injury.      Review of Systems   Constitutional: Negative for chills, diaphoresis and fever.   HENT:  Negative for congestion, ear discharge and ear pain.    Eyes:  Negative for blurred vision, discharge, double vision and pain.   Cardiovascular:  Negative for chest pain, claudication and cyanosis.   Respiratory:  Negative for cough, hemoptysis and shortness of breath.    Endocrine: Negative for cold intolerance and heat intolerance.   Skin:  Negative for color change, dry skin, itching and rash.   Musculoskeletal:  Positive for joint pain and muscle weakness. Negative for arthritis, back pain, falls, gout, joint swelling and neck pain.   Gastrointestinal:  Negative for abdominal pain and change in bowel habit.   Neurological:   Negative for brief paralysis, disturbances in coordination, dizziness, numbness and paresthesias.   Psychiatric/Behavioral:  Negative for altered mental status and depression.        Objective:          General    Constitutional: She is oriented to person, place, and time. She appears well-developed and well-nourished. No distress.   HENT:   Head: Atraumatic.   Eyes: EOM are normal. Right eye exhibits no discharge. Left eye exhibits no discharge.   Cardiovascular:  Normal rate.            Pulmonary/Chest: Effort normal. No respiratory distress.   Abdominal: Soft.   Neurological: She is alert and oriented to person, place, and time.   Psychiatric: She has a normal mood and affect. Her behavior is normal.         Right Shoulder Exam     Inspection/Observation   Swelling: absent  Bruising: absent  Scars: absent  Deformity: absent    Range of Motion   Active abduction:  normal   Passive abduction:  normal   Extension:  normal   Forward Flexion:  normal   Forward Elevation: normal  Internal rotation 0 degrees:  normal     Tests & Signs   Salinas test: negative  Impingement: negative  Lift Off Sign: negative  Belly Press: negative    Other   Sensation: normal    Left Shoulder Exam     Inspection/Observation   Swelling: absent  Bruising: absent  Scars: absent  Deformity: absent    Range of Motion   Active abduction:  normal   Passive abduction:  normal   Extension:  normal   Forward Flexion:  normal   Forward Elevation: normal  Internal rotation 0 degrees:  abnormal     Tests & Signs   Salinas test: positive  Impingement: positive  Lift Off Sign: positive  Belly Press: positive    Other   Sensation: normal     Comments:  Tenderness to anterior shoulder      Vascular Exam     Right Pulses      Radial:                    2+      Left Pulses      Radial:                    2+      Capillary Refill  Right Hand: normal capillary refill  Left Hand: normal capillary refill                Assessment:         Xray Left Shoulder  3/1/23:  1. No abnormality identified.      Encounter Diagnosis   Name Primary?    Chronic left shoulder pain Yes        Chronic left shoulder pain                   Plan:         The total face-to-face encounter time with this patient was 20 minutes and greater than 50% of of the encounter time was spent counseling the patient, coordinating care, and education regarding the diagnosis. We have discussed a variety of treatment options including medications, injections, physical therapy and other alternative treatments. I also explained the indications, risks and benefits of surgery. Patient would like to proceed continue PT at this time. She reports that she also has follow up with [facility] this week for neck pain and c-spine MRI results.    I made the decision to obtain old records of the patient including previous notes and imaging.     Continue PT as scheduled.  2. Ice compress to the affected area 2-3x a day for 15-20 minutes as needed for pain management.  3. Continue diclofenac as prescribed as needed.  4. RTC after completion of PT, sooner if needed. Pt will call clinic to schedule.    Patient voices understanding of and agreement with treatment plan. All of the patient's questions were answered and the patient will contact us if she has any questions or concerns in the interim.

## 2023-06-26 ENCOUNTER — CLINICAL SUPPORT (OUTPATIENT)
Dept: REHABILITATION | Facility: HOSPITAL | Age: 53
End: 2023-06-26
Payer: COMMERCIAL

## 2023-06-26 DIAGNOSIS — M25.512 CHRONIC LEFT SHOULDER PAIN: Primary | ICD-10-CM

## 2023-06-26 DIAGNOSIS — G89.29 CHRONIC LEFT SHOULDER PAIN: Primary | ICD-10-CM

## 2023-06-26 PROCEDURE — 97530 THERAPEUTIC ACTIVITIES: CPT

## 2023-06-26 NOTE — PROGRESS NOTES
"Patient:  Dalia Bearden [facility] #:  [id number]   Date of Note: 06/26/2023   Referring Physician:  Mary Siddiqi PA-C  Diagnosis:    Encounter Diagnosis   Name Primary?    Chronic left shoulder pain Yes       Start Time: 10:00 AM  End Time: 11:00 AM  Total Time: 60 min    Subjective:   "I'm alright."     Pain: 0 out of 10 left shoulder    Objective:   Patient seen by OT this session. Treatment  consist of the following: manual therapy/joint mobilization, Therapeutic exercises, Therapeutic activities, and Neuromuscular re-education     Treatment:  -Omnicycle Level 8 for 6 min (3 min forward and 3 min backwards) for neuro re-education   - Pully in abduction x 3 min x 2 trials for neuro re-education  - Y, reverse Y, and Ts with doubled YTB 3 x 10  - left shoulder flexion and abduction with RTB 3 x 10 each direction  - Low rows with GTB for 3 x 10  -IR/ER with RTB for 2 x 10    Assessment:  Pt tolerating resistance well without reports of pain with min rest breaks throughout. Patient able to complete Omnicycle this day with no report of increase in pain. Pt reporting pain during external walk outs with GTB. Occupational therapist /ALBERT collaboration to discuss POC, improvements, and d/c planning on todays date.  Patient reported she is compliant with home exercise program.     Pt will continue to benefit from skilled OT intervention. Patient continues to demonstrate limitation  with  ROM, Joint mobility, Stiffness, Decreased functional use, Decreased strength, Continued pain, and Continued inflammation    Goals to be completed in 6 weeks:  1) Pt will be independent and report performing HEP to maximize (L) shoulder functional capacity.   2) Pt will increase L shoulder AROM in all measured planes of motion by 3-5 degrees with daily task.   3) Pt will increase ability to move/handle objects AEB decreased score on QuickDASH <=20.5% (currently 27.3%)   4) Pt will report one degree less of pain with use.   5) Pt " will report use of home modalities for pain management.     Plan  Dalia to be treated by Occupational Therapy 2 times per week for 6 weeks to achieve the established goals.   Treatment to include Ultrasoud @ 3mHz, AAROM Mobilization of GH joint, PROM Home program, Ice, Moist heat, Strengthening Theraband Ex, UBE , and E- stim as well as any other treatments deemed necessary based on the patient's needs or progress.      Certification Dates: 6/3/2023 - 7/14/2023

## 2023-06-27 ENCOUNTER — TELEPHONE (OUTPATIENT)
Dept: REHABILITATION | Facility: HOSPITAL | Age: 53
End: 2023-06-27
Payer: COMMERCIAL

## 2023-06-28 ENCOUNTER — CLINICAL SUPPORT (OUTPATIENT)
Dept: REHABILITATION | Facility: HOSPITAL | Age: 53
End: 2023-06-28
Payer: COMMERCIAL

## 2023-06-28 DIAGNOSIS — M25.512 CHRONIC LEFT SHOULDER PAIN: Primary | ICD-10-CM

## 2023-06-28 DIAGNOSIS — G89.29 CHRONIC LEFT SHOULDER PAIN: Primary | ICD-10-CM

## 2023-06-28 PROCEDURE — 97530 THERAPEUTIC ACTIVITIES: CPT

## 2023-06-28 NOTE — PROGRESS NOTES
"Patient:  Dalia Bearden [facility] #:  [id number]   Date of Note: 06/28/2023   Referring Physician:  Mary Siddiqi PA-C  Diagnosis:    Encounter Diagnosis   Name Primary?    Chronic left shoulder pain Yes     Treatment/Discharge Note    Start Time: 10:00 AM  End Time: 10:40 AM  Total Time: 40 min    Subjective:   "I'm alright. I don't think my problem is coming from my shoulder"     Pain: 0 out of 10 left shoulder    Objective:   Patient seen by OT this session. Treatment  consist of the following: manual therapy/joint mobilization, Therapeutic exercises, Therapeutic activities, and Neuromuscular re-education     Range of Motion:   Right: Not tested  Left: limited as follows: (see measurements table below)       (L) UE       AROM Norm   Shoulder Flexion   0-180   Shoulder Flexion 90 120 160 180   Shoulder Abduction 90 110 110 0-180   Shoulder Extension 20 35 36 45 0-50      ROM Comments:   Hard end feel and Pain at end range        Strength  Shoulder Flexion LUE: 4-/5   Shoulder Extension LUE: 4-/5   Shoulder Abduction LUE: 4-/5   Shoulder Abbduction LUE: 4-/5   Internal Rotation LUE:  4-/5   External Rotation LUE:  4-/5   Horizontal Adduction LUE:  4-/5      Quick Dash: 56.8% 27.3% 18.2% 15.9%    Treatment:  -Omnicycle Level 8 for 6 min (3 min forward and 3 min backwards) for neuro re-education   -reassessment    Assessment:  Pt tolerating resistance well without reports of pain with min rest breaks throughout. Patient able to complete Omnicycle this day with no report of increase in pain. Pt objective measurements taken this day and goals updated below. Pt to be discharged from skilled OT as pt has reached a plateau. Pt is WFL in ADLs and has no pain in flexion. Pt only experiences some pain in abduction. Pt educated on d/c plans and agrees.     Goals to be completed in 6 weeks:  1) Pt will be independent and report performing HEP to maximize (L) shoulder functional capacity. MET  2) Pt will increase L " shoulder AROM in all measured planes of motion by 3-5 degrees with daily task.  Not Met/ not progressing  3) Pt will increase ability to move/handle objects AEB decreased score on QuickDASH <=20.5% (currently 15.9%) MET  4) Pt will report one degree less of pain with use. MET  5) Pt will report use of home modalities for pain management. MET    Plan  Dalia to be discharged from Occupational Therapy as pt has achieved most of the established goals and no longer requires assistance in ADLs.

## 2024-03-28 ENCOUNTER — LAB VISIT (OUTPATIENT)
Dept: LAB | Facility: HOSPITAL | Age: 54
End: 2024-03-28
Attending: INTERNAL MEDICINE
Payer: MEDICAID

## 2024-03-28 DIAGNOSIS — R76.8 SS-A ANTIBODY POSITIVE: ICD-10-CM

## 2024-03-28 LAB
ALBUMIN SERPL BCP-MCNC: 3.7 G/DL (ref 3.5–5.2)
ALP SERPL-CCNC: 96 U/L (ref 55–135)
ALT SERPL W/O P-5'-P-CCNC: 16 U/L (ref 10–44)
ANION GAP SERPL CALC-SCNC: 1 MMOL/L (ref 3–11)
AST SERPL-CCNC: 12 U/L (ref 10–40)
BACTERIA #/AREA URNS HPF: NEGATIVE /HPF
BASOPHILS # BLD AUTO: 0.03 K/UL (ref 0–0.2)
BASOPHILS NFR BLD: 0.8 % (ref 0–1.9)
BILIRUB SERPL-MCNC: 0.5 MG/DL (ref 0.1–1)
BILIRUB UR QL STRIP: NEGATIVE
BUN SERPL-MCNC: 15 MG/DL (ref 6–20)
C3 SERPL-MCNC: 132 MG/DL (ref 50–180)
C4 SERPL-MCNC: 45 MG/DL (ref 11–44)
CALCIUM SERPL-MCNC: 8.9 MG/DL (ref 8.7–10.5)
CHLORIDE SERPL-SCNC: 106 MMOL/L (ref 95–110)
CLARITY UR: ABNORMAL
CO2 SERPL-SCNC: 34 MMOL/L (ref 23–29)
COLOR UR: YELLOW
CREAT SERPL-MCNC: 1 MG/DL (ref 0.5–1.4)
CRP SERPL-MCNC: <0.29 MG/DL (ref 0–0.75)
DIFFERENTIAL METHOD BLD: ABNORMAL
EOSINOPHIL # BLD AUTO: 0.1 K/UL (ref 0–0.5)
EOSINOPHIL NFR BLD: 3 % (ref 0–8)
ERYTHROCYTE [DISTWIDTH] IN BLOOD BY AUTOMATED COUNT: 13.3 % (ref 11.5–14.5)
ERYTHROCYTE [SEDIMENTATION RATE] IN BLOOD: 36 MM/HR (ref 0–20)
EST. GFR  (NO RACE VARIABLE): >60 ML/MIN/1.73 M^2
GLUCOSE SERPL-MCNC: 88 MG/DL (ref 70–110)
GLUCOSE UR QL STRIP: NEGATIVE
HCT VFR BLD AUTO: 38.4 % (ref 37–48.5)
HGB BLD-MCNC: 12 G/DL (ref 12–16)
HGB UR QL STRIP: NEGATIVE
HYALINE CASTS #/AREA URNS LPF: 5.1 /LPF
IMM GRANULOCYTES # BLD AUTO: 0 K/UL (ref 0–0.04)
IMM GRANULOCYTES NFR BLD AUTO: 0 % (ref 0–0.5)
KETONES UR QL STRIP: ABNORMAL
LEUKOCYTE ESTERASE UR QL STRIP: NEGATIVE
LYMPHOCYTES # BLD AUTO: 2.1 K/UL (ref 1–4.8)
LYMPHOCYTES NFR BLD: 57.4 % (ref 18–48)
MCH RBC QN AUTO: 29.4 PG (ref 27–31)
MCHC RBC AUTO-ENTMCNC: 31.3 G/DL (ref 32–36)
MCV RBC AUTO: 94 FL (ref 82–98)
MICROSCOPIC COMMENT: ABNORMAL
MONOCYTES # BLD AUTO: 0.2 K/UL (ref 0.3–1)
MONOCYTES NFR BLD: 6.2 % (ref 4–15)
NEUTROPHILS # BLD AUTO: 1.2 K/UL (ref 1.8–7.7)
NEUTROPHILS NFR BLD: 32.6 % (ref 38–73)
NITRITE UR QL STRIP: NEGATIVE
NRBC BLD-RTO: 0 /100 WBC
PH UR STRIP: 7 [PH] (ref 5–8)
PLATELET # BLD AUTO: 211 K/UL (ref 150–450)
PMV BLD AUTO: 10.5 FL (ref 9.2–12.9)
POTASSIUM SERPL-SCNC: 4.2 MMOL/L (ref 3.5–5.1)
PROT SERPL-MCNC: 7.8 G/DL (ref 6–8.4)
PROT UR QL STRIP: ABNORMAL
RBC # BLD AUTO: 4.08 M/UL (ref 4–5.4)
RBC #/AREA URNS HPF: 7 /HPF (ref 0–4)
SODIUM SERPL-SCNC: 141 MMOL/L (ref 136–145)
SP GR UR STRIP: 1.02 (ref 1–1.03)
SQUAMOUS #/AREA URNS HPF: 16 /HPF
URN SPEC COLLECT METH UR: ABNORMAL
UROBILINOGEN UR STRIP-ACNC: 1 EU/DL
WBC # BLD AUTO: 3.71 K/UL (ref 3.9–12.7)
WBC #/AREA URNS HPF: 10 /HPF (ref 0–5)

## 2024-03-28 PROCEDURE — 85651 RBC SED RATE NONAUTOMATED: CPT | Performed by: INTERNAL MEDICINE

## 2024-03-28 PROCEDURE — 85025 COMPLETE CBC W/AUTO DIFF WBC: CPT | Performed by: INTERNAL MEDICINE

## 2024-03-28 PROCEDURE — 36415 COLL VENOUS BLD VENIPUNCTURE: CPT | Performed by: INTERNAL MEDICINE

## 2024-03-28 PROCEDURE — 86140 C-REACTIVE PROTEIN: CPT | Performed by: INTERNAL MEDICINE

## 2024-03-28 PROCEDURE — 86160 COMPLEMENT ANTIGEN: CPT | Mod: 59 | Performed by: INTERNAL MEDICINE

## 2024-03-28 PROCEDURE — 81000 URINALYSIS NONAUTO W/SCOPE: CPT | Performed by: INTERNAL MEDICINE

## 2024-03-28 PROCEDURE — 86225 DNA ANTIBODY NATIVE: CPT | Performed by: INTERNAL MEDICINE

## 2024-03-28 PROCEDURE — 80053 COMPREHEN METABOLIC PANEL: CPT | Performed by: INTERNAL MEDICINE

## 2024-03-28 PROCEDURE — 86160 COMPLEMENT ANTIGEN: CPT | Performed by: INTERNAL MEDICINE

## 2024-04-01 LAB — DSDNA AB SER-ACNC: NORMAL [IU]/ML

## 2024-08-13 ENCOUNTER — HOSPITAL ENCOUNTER (EMERGENCY)
Facility: HOSPITAL | Age: 54
Discharge: HOME | End: 2024-08-13
Attending: FAMILY MEDICINE
Payer: MEDICAID

## 2024-08-13 VITALS
BODY MASS INDEX: 29.41 KG/M2 | WEIGHT: 183 LBS | TEMPERATURE: 98 F | SYSTOLIC BLOOD PRESSURE: 160 MMHG | RESPIRATION RATE: 18 BRPM | DIASTOLIC BLOOD PRESSURE: 105 MMHG | HEART RATE: 83 BPM | HEIGHT: 66 IN | OXYGEN SATURATION: 100 %

## 2024-08-13 DIAGNOSIS — H10.11 ALLERGIC CONJUNCTIVITIS OF RIGHT EYE: Primary | ICD-10-CM

## 2024-08-13 DIAGNOSIS — J30.9 ALLERGIC RHINITIS, UNSPECIFIED SEASONALITY, UNSPECIFIED TRIGGER: ICD-10-CM

## 2024-08-13 LAB
CTP QC/QA: YES
SARS-COV-2 RDRP RESP QL NAA+PROBE: NEGATIVE

## 2024-08-13 PROCEDURE — 96372 THER/PROPH/DIAG INJ SC/IM: CPT | Performed by: NURSE PRACTITIONER

## 2024-08-13 PROCEDURE — 99284 EMERGENCY DEPT VISIT MOD MDM: CPT | Mod: 25

## 2024-08-13 PROCEDURE — 63600175 PHARM REV CODE 636 W HCPCS: Performed by: NURSE PRACTITIONER

## 2024-08-13 PROCEDURE — 87635 SARS-COV-2 COVID-19 AMP PRB: CPT | Performed by: NURSE PRACTITIONER

## 2024-08-13 RX ORDER — DEXAMETHASONE SODIUM PHOSPHATE 4 MG/ML
8 INJECTION, SOLUTION INTRA-ARTICULAR; INTRALESIONAL; INTRAMUSCULAR; INTRAVENOUS; SOFT TISSUE
Status: COMPLETED | OUTPATIENT
Start: 2024-08-13 | End: 2024-08-13

## 2024-08-13 RX ADMIN — DEXAMETHASONE SODIUM PHOSPHATE 8 MG: 4 INJECTION INTRA-ARTICULAR; INTRALESIONAL; INTRAMUSCULAR; INTRAVENOUS; SOFT TISSUE at 12:08

## 2024-08-13 NOTE — ED PROVIDER NOTES
Dictation #1  MRN:[id number]  CSN:[id number] Encounter Date: 8/13/2024       History     Chief Complaint   Patient presents with    Eye Problem     Patient reports working in the yard 4-5 days ago. States she thought she got something in right eye. States symptoms seemed to resolve. Later that night, she felt nauseated. Began sneezing over the weekend. Today, has swelling to right side of face / eye.      This is a 53-year-old black female with history allergies and asthma who presents to the emergency department with concerns regarding right eye irritation.  Patient reports that about 4 days ago she felt something fly into the right eye while cutting grass.  She states that symptoms immediately resolved but 2 days later she began with red eye, watery eye, itchy eye, stuffy/runny nose, sneezing, and general right facial puffiness.  She also reports mild nonproductive cough.  She denies measured fever, chest pain, shortness of breath, nausea, vomiting, or diarrhea.      Review of patient's allergies indicates:   Allergen Reactions    Ibuprofen Other (See Comments)     Causes bleeding in stomach    Doxycycline Nausea And Vomiting     Past Medical History:   Diagnosis Date    Acid reflux     Allergy     Anemia     Angio-edema     Arthritis     Asthma     Depression     Fever blister     Fibromyalgia     Fibromyalgia     Glaucoma     Hypercholesteremia     Hypertension     Irregular heart beat     Joint pain     Migraines     Mixed connective tissue disease     Neuromuscular disorder     Osteoarthritis     Osteoporosis     Restless leg syndrome     Urticaria     Vitamin D deficiency      Past Surgical History:   Procedure Laterality Date    BLADDER AUGMENTATION      COLPOSCOPY      HYSTERECTOMY      TONSILLECTOMY      TUBAL LIGATION       Family History   Problem Relation Name Age of Onset    Arthritis Mother      Heart failure Mother      Hypertension Mother      Colon polyps Mother      Allergic rhinitis Mother       Allergies Mother      Heart attack Father      Hypertension Father      Hyperlipidemia Father      Diabetes Mellitus Father      Anemia Sister Silva     Rheum arthritis Brother Alfredo      Social History     Tobacco Use    Smoking status: Never    Smokeless tobacco: Never   Substance Use Topics    Alcohol use: No     Alcohol/week: 0.0 standard drinks of alcohol    Drug use: No     Review of Systems   Constitutional:  Negative for appetite change and fever.   HENT:  Positive for congestion, rhinorrhea and sneezing. Negative for sore throat.    Eyes:  Positive for discharge (watery), redness and itching. Negative for visual disturbance.       Physical Exam     Initial Vitals [08/13/24 1123]   BP Pulse Resp Temp SpO2   (!) 160/105 83 18 98.1 °F (36.7 °C) 100 %      MAP       --         Physical Exam    Nursing note and vitals reviewed.  Constitutional: Vital signs are normal. She appears well-developed and well-nourished. She is cooperative.   HENT:   Head: Normocephalic and atraumatic.   Eyes: EOM and lids are normal. Pupils are equal, round, and reactive to light. Right eye exhibits no chemosis, no discharge, no exudate and no hordeolum. No foreign body present in the right eye. Right conjunctiva is injected. Right conjunctiva has no hemorrhage. Left conjunctiva is not injected. Left conjunctiva has no hemorrhage.   Neck: Trachea normal.   Normal range of motion.   Full passive range of motion without pain.     Cardiovascular:  Normal rate, regular rhythm, normal heart sounds and normal pulses.           Pulmonary/Chest: Breath sounds normal. No respiratory distress.   Musculoskeletal:         General: Normal range of motion.      Cervical back: Full passive range of motion without pain and normal range of motion.     Neurological: She is alert and oriented to person, place, and time. GCS score is 15. GCS eye subscore is 4. GCS verbal subscore is 5. GCS motor subscore is 6.   Skin: Skin is warm and dry.    Psychiatric: She has a normal mood and affect. Her speech is normal and behavior is normal.         ED Course   Procedures  Labs Reviewed   SARS-COV-2 RDRP GENE       Result Value    POC Rapid COVID Negative       Acceptable Yes            Imaging Results    None          Medications   dexAMETHasone injection 8 mg (has no administration in time range)     Medical Decision Making  Amount and/or Complexity of Data Reviewed  Labs: ordered.    Risk  Prescription drug management.               ED Course as of 08/13/24 1214   Tue Aug 13, 2024   1212 Rapid COVID-19 negative [CB]      ED Course User Index  [CB] Deisi Brooke NP                           Clinical Impression:  Final diagnoses:  [H10.11] Allergic conjunctivitis of right eye (Primary)  [J30.9] Allergic rhinitis, unspecified seasonality, unspecified trigger          ED Disposition Condition    Discharge Stable          ED Prescriptions    None       Follow-up Information       Follow up With Specialties Details Why Contact Info    PCP Follow UP  Schedule an appointment as soon as possible for a visit in 2 days for follow-up, for re-evaluation of today's complaint              Deisi Brooke NP  08/13/24 1214

## 2024-08-13 NOTE — Clinical Note
"Dalia"Mima Valentine was seen and treated in our emergency department on 8/13/2024.  She may return to work on 08/14/2024.       If you have any questions or concerns, please don't hesitate to call.      Diesi Brooke, NP"

## 2024-08-13 NOTE — DISCHARGE INSTRUCTIONS
Continue home medications as directed.  Plan to follow-up with your doctor in 1-2 days and return to the emergency department for worsening condition.

## 2025-07-28 PROBLEM — R76.8 SS-A ANTIBODY POSITIVE: Status: ACTIVE | Noted: 2025-07-28

## 2025-07-28 PROBLEM — D72.819 LEUCOPENIA: Status: ACTIVE | Noted: 2025-07-28

## 2025-08-11 DIAGNOSIS — M54.12 CERVICAL RADICULOPATHY: Primary | ICD-10-CM

## 2025-08-11 DIAGNOSIS — M54.16 LUMBAR RADICULOPATHY: ICD-10-CM

## 2025-08-26 ENCOUNTER — CLINICAL SUPPORT (OUTPATIENT)
Facility: HOSPITAL | Age: 55
End: 2025-08-26
Payer: MEDICAID

## 2025-08-26 DIAGNOSIS — M54.12 CERVICAL RADICULOPATHY: Primary | ICD-10-CM

## 2025-08-26 PROCEDURE — 97162 PT EVAL MOD COMPLEX 30 MIN: CPT

## 2025-08-26 PROCEDURE — 97110 THERAPEUTIC EXERCISES: CPT

## 2025-09-03 ENCOUNTER — CLINICAL SUPPORT (OUTPATIENT)
Facility: HOSPITAL | Age: 55
End: 2025-09-03
Payer: MEDICAID

## 2025-09-03 DIAGNOSIS — M54.12 CERVICAL RADICULOPATHY: Primary | ICD-10-CM

## 2025-09-03 PROCEDURE — 97110 THERAPEUTIC EXERCISES: CPT | Mod: CQ

## 2025-09-05 ENCOUNTER — CLINICAL SUPPORT (OUTPATIENT)
Facility: HOSPITAL | Age: 55
End: 2025-09-05
Payer: MEDICAID

## 2025-09-05 DIAGNOSIS — M54.12 CERVICAL RADICULOPATHY: Primary | ICD-10-CM

## 2025-09-05 PROCEDURE — 97110 THERAPEUTIC EXERCISES: CPT | Mod: CQ
